# Patient Record
Sex: MALE | Race: WHITE | NOT HISPANIC OR LATINO | Employment: FULL TIME | URBAN - METROPOLITAN AREA
[De-identification: names, ages, dates, MRNs, and addresses within clinical notes are randomized per-mention and may not be internally consistent; named-entity substitution may affect disease eponyms.]

---

## 2022-03-26 ENCOUNTER — APPOINTMENT (OUTPATIENT)
Dept: CT IMAGING | Facility: HOSPITAL | Age: 47
End: 2022-03-26

## 2022-03-26 ENCOUNTER — APPOINTMENT (OUTPATIENT)
Dept: GENERAL RADIOLOGY | Facility: HOSPITAL | Age: 47
End: 2022-03-26

## 2022-03-26 ENCOUNTER — APPOINTMENT (OUTPATIENT)
Dept: CARDIOLOGY | Facility: HOSPITAL | Age: 47
End: 2022-03-26

## 2022-03-26 ENCOUNTER — HOSPITAL ENCOUNTER (INPATIENT)
Facility: HOSPITAL | Age: 47
LOS: 2 days | Discharge: HOME | End: 2022-03-28
Attending: EMERGENCY MEDICINE | Admitting: INTERNAL MEDICINE

## 2022-03-26 ENCOUNTER — APPOINTMENT (OUTPATIENT)
Dept: MRI IMAGING | Facility: HOSPITAL | Age: 47
End: 2022-03-26

## 2022-03-26 DIAGNOSIS — I63.9 CEREBROVASCULAR ACCIDENT (CVA), UNSPECIFIED MECHANISM: ICD-10-CM

## 2022-03-26 DIAGNOSIS — R20.2 NUMBNESS AND TINGLING OF LEFT ARM AND LEG: ICD-10-CM

## 2022-03-26 DIAGNOSIS — R03.0 ELEVATED BLOOD PRESSURE READING WITHOUT DIAGNOSIS OF HYPERTENSION: Primary | ICD-10-CM

## 2022-03-26 DIAGNOSIS — R20.0 NUMBNESS AND TINGLING OF LEFT ARM AND LEG: ICD-10-CM

## 2022-03-26 DIAGNOSIS — R27.8 DYSMETRIA: ICD-10-CM

## 2022-03-26 DIAGNOSIS — R73.9 HYPERGLYCEMIA: ICD-10-CM

## 2022-03-26 PROBLEM — E11.9 TYPE 2 DIABETES MELLITUS: Status: ACTIVE | Noted: 2022-03-26

## 2022-03-26 LAB
ALBUMIN SERPL-MCNC: 4.7 G/DL (ref 3.5–5.2)
ALBUMIN/GLOB SERPL: 1.6 G/DL
ALP SERPL-CCNC: 74 U/L (ref 39–117)
ALT SERPL W P-5'-P-CCNC: 28 U/L (ref 1–41)
ALT SERPL W P-5'-P-CCNC: 30 U/L (ref 1–41)
ANION GAP SERPL CALCULATED.3IONS-SCNC: 12 MMOL/L (ref 5–15)
APTT PPP: 27.7 SECONDS (ref 22–39)
AST SERPL-CCNC: 19 U/L (ref 1–40)
AST SERPL-CCNC: 20 U/L (ref 1–40)
BASOPHILS # BLD AUTO: 0.03 10*3/MM3 (ref 0–0.2)
BASOPHILS NFR BLD AUTO: 0.3 % (ref 0–1.5)
BILIRUB SERPL-MCNC: 0.6 MG/DL (ref 0–1.2)
BILIRUB UR QL STRIP: NEGATIVE
BUN BLDA-MCNC: 20 MG/DL (ref 8–26)
BUN SERPL-MCNC: 19 MG/DL (ref 6–20)
BUN/CREAT SERPL: 18.8 (ref 7–25)
CA-I BLDA-SCNC: 1.22 MMOL/L (ref 1.2–1.32)
CALCIUM SPEC-SCNC: 9.8 MG/DL (ref 8.6–10.5)
CHLORIDE BLDA-SCNC: 99 MMOL/L (ref 98–109)
CHLORIDE SERPL-SCNC: 98 MMOL/L (ref 98–107)
CLARITY UR: CLEAR
CO2 BLDA-SCNC: 25 MMOL/L (ref 24–29)
CO2 SERPL-SCNC: 24 MMOL/L (ref 22–29)
COLOR UR: YELLOW
CREAT BLDA-MCNC: 0.9 MG/DL (ref 0.6–1.3)
CREAT SERPL-MCNC: 1.01 MG/DL (ref 0.76–1.27)
DEPRECATED RDW RBC AUTO: 38.7 FL (ref 37–54)
EGFRCR SERPLBLD CKD-EPI 2021: 106 ML/MIN/1.73
EGFRCR SERPLBLD CKD-EPI 2021: 92.3 ML/MIN/1.73
EOSINOPHIL # BLD AUTO: 0.03 10*3/MM3 (ref 0–0.4)
EOSINOPHIL NFR BLD AUTO: 0.3 % (ref 0.3–6.2)
ERYTHROCYTE [DISTWIDTH] IN BLOOD BY AUTOMATED COUNT: 12.8 % (ref 12.3–15.4)
FLUAV RNA RESP QL NAA+PROBE: NOT DETECTED
FLUBV RNA RESP QL NAA+PROBE: NOT DETECTED
GLOBULIN UR ELPH-MCNC: 2.9 GM/DL
GLUCOSE BLDC GLUCOMTR-MCNC: 225 MG/DL (ref 70–130)
GLUCOSE BLDC GLUCOMTR-MCNC: 270 MG/DL (ref 70–130)
GLUCOSE BLDC GLUCOMTR-MCNC: 282 MG/DL (ref 70–130)
GLUCOSE SERPL-MCNC: 295 MG/DL (ref 65–99)
GLUCOSE UR STRIP-MCNC: ABNORMAL MG/DL
HBA1C MFR BLD: 9.4 % (ref 4.8–5.6)
HCT VFR BLD AUTO: 43.9 % (ref 37.5–51)
HCT VFR BLDA CALC: 46 % (ref 38–51)
HGB BLD-MCNC: 15.8 G/DL (ref 13–17.7)
HGB BLDA-MCNC: 15.6 G/DL (ref 12–17)
HGB UR QL STRIP.AUTO: NEGATIVE
HOLD SPECIMEN: NORMAL
IMM GRANULOCYTES # BLD AUTO: 0.03 10*3/MM3 (ref 0–0.05)
IMM GRANULOCYTES NFR BLD AUTO: 0.3 % (ref 0–0.5)
INR PPP: 1.1 (ref 0.8–1.2)
KETONES UR QL STRIP: ABNORMAL
LEUKOCYTE ESTERASE UR QL STRIP.AUTO: NEGATIVE
LYMPHOCYTES # BLD AUTO: 1.43 10*3/MM3 (ref 0.7–3.1)
LYMPHOCYTES NFR BLD AUTO: 12.2 % (ref 19.6–45.3)
MAGNESIUM SERPL-MCNC: 2.2 MG/DL (ref 1.6–2.6)
MCH RBC QN AUTO: 29.8 PG (ref 26.6–33)
MCHC RBC AUTO-ENTMCNC: 36 G/DL (ref 31.5–35.7)
MCV RBC AUTO: 82.8 FL (ref 79–97)
MONOCYTES # BLD AUTO: 0.65 10*3/MM3 (ref 0.1–0.9)
MONOCYTES NFR BLD AUTO: 5.6 % (ref 5–12)
NEUTROPHILS NFR BLD AUTO: 81.3 % (ref 42.7–76)
NEUTROPHILS NFR BLD AUTO: 9.52 10*3/MM3 (ref 1.7–7)
NITRITE UR QL STRIP: NEGATIVE
NRBC BLD AUTO-RTO: 0 /100 WBC (ref 0–0.2)
PH UR STRIP.AUTO: 5.5 [PH] (ref 5–8)
PLATELET # BLD AUTO: 217 10*3/MM3 (ref 140–450)
PMV BLD AUTO: 10.4 FL (ref 6–12)
POTASSIUM BLDA-SCNC: 3.8 MMOL/L (ref 3.5–4.9)
POTASSIUM SERPL-SCNC: 3.9 MMOL/L (ref 3.5–5.2)
PROT SERPL-MCNC: 7.6 G/DL (ref 6–8.5)
PROT UR QL STRIP: NEGATIVE
PROTHROMBIN TIME: 13.5 SECONDS (ref 12.8–15.2)
RBC # BLD AUTO: 5.3 10*6/MM3 (ref 4.14–5.8)
SARS-COV-2 RNA RESP QL NAA+PROBE: NOT DETECTED
SODIUM BLD-SCNC: 136 MMOL/L (ref 138–146)
SODIUM SERPL-SCNC: 134 MMOL/L (ref 136–145)
SP GR UR STRIP: 1.06 (ref 1–1.03)
T4 FREE SERPL-MCNC: 0.99 NG/DL (ref 0.93–1.7)
TROPONIN T SERPL-MCNC: <0.01 NG/ML (ref 0–0.03)
TSH SERPL DL<=0.05 MIU/L-ACNC: 0.63 UIU/ML (ref 0.27–4.2)
UROBILINOGEN UR QL STRIP: ABNORMAL
WBC NRBC COR # BLD: 11.69 10*3/MM3 (ref 3.4–10.8)
WHOLE BLOOD HOLD SPECIMEN: NORMAL
WHOLE BLOOD HOLD SPECIMEN: NORMAL

## 2022-03-26 PROCEDURE — 85610 PROTHROMBIN TIME: CPT

## 2022-03-26 PROCEDURE — 93306 TTE W/DOPPLER COMPLETE: CPT | Performed by: INTERNAL MEDICINE

## 2022-03-26 PROCEDURE — 93005 ELECTROCARDIOGRAM TRACING: CPT | Performed by: EMERGENCY MEDICINE

## 2022-03-26 PROCEDURE — 71045 X-RAY EXAM CHEST 1 VIEW: CPT

## 2022-03-26 PROCEDURE — 70551 MRI BRAIN STEM W/O DYE: CPT

## 2022-03-26 PROCEDURE — 82962 GLUCOSE BLOOD TEST: CPT

## 2022-03-26 PROCEDURE — 84460 ALANINE AMINO (ALT) (SGPT): CPT | Performed by: EMERGENCY MEDICINE

## 2022-03-26 PROCEDURE — 63710000001 INSULIN LISPRO (HUMAN) PER 5 UNITS: Performed by: INTERNAL MEDICINE

## 2022-03-26 PROCEDURE — 4A03X5D MEASUREMENT OF ARTERIAL FLOW, INTRACRANIAL, EXTERNAL APPROACH: ICD-10-PCS | Performed by: RADIOLOGY

## 2022-03-26 PROCEDURE — 70496 CT ANGIOGRAPHY HEAD: CPT

## 2022-03-26 PROCEDURE — 80047 BASIC METABLC PNL IONIZED CA: CPT

## 2022-03-26 PROCEDURE — 85014 HEMATOCRIT: CPT

## 2022-03-26 PROCEDURE — 84439 ASSAY OF FREE THYROXINE: CPT | Performed by: EMERGENCY MEDICINE

## 2022-03-26 PROCEDURE — 92523 SPEECH SOUND LANG COMPREHEN: CPT

## 2022-03-26 PROCEDURE — 0042T HC CT CEREBRAL PERFUSION W/WO CONTRAST: CPT

## 2022-03-26 PROCEDURE — 99284 EMERGENCY DEPT VISIT MOD MDM: CPT

## 2022-03-26 PROCEDURE — 83036 HEMOGLOBIN GLYCOSYLATED A1C: CPT | Performed by: INTERNAL MEDICINE

## 2022-03-26 PROCEDURE — 84484 ASSAY OF TROPONIN QUANT: CPT | Performed by: EMERGENCY MEDICINE

## 2022-03-26 PROCEDURE — 85730 THROMBOPLASTIN TIME PARTIAL: CPT | Performed by: EMERGENCY MEDICINE

## 2022-03-26 PROCEDURE — 93306 TTE W/DOPPLER COMPLETE: CPT

## 2022-03-26 PROCEDURE — 87636 SARSCOV2 & INF A&B AMP PRB: CPT | Performed by: INTERNAL MEDICINE

## 2022-03-26 PROCEDURE — 99223 1ST HOSP IP/OBS HIGH 75: CPT | Performed by: INTERNAL MEDICINE

## 2022-03-26 PROCEDURE — 84450 TRANSFERASE (AST) (SGOT): CPT | Performed by: EMERGENCY MEDICINE

## 2022-03-26 PROCEDURE — 0 IOPAMIDOL PER 1 ML: Performed by: EMERGENCY MEDICINE

## 2022-03-26 PROCEDURE — 70498 CT ANGIOGRAPHY NECK: CPT

## 2022-03-26 PROCEDURE — 83735 ASSAY OF MAGNESIUM: CPT | Performed by: EMERGENCY MEDICINE

## 2022-03-26 PROCEDURE — 84443 ASSAY THYROID STIM HORMONE: CPT | Performed by: EMERGENCY MEDICINE

## 2022-03-26 PROCEDURE — 70450 CT HEAD/BRAIN W/O DYE: CPT

## 2022-03-26 PROCEDURE — 99223 1ST HOSP IP/OBS HIGH 75: CPT | Performed by: NURSE PRACTITIONER

## 2022-03-26 PROCEDURE — 85025 COMPLETE CBC W/AUTO DIFF WBC: CPT | Performed by: EMERGENCY MEDICINE

## 2022-03-26 PROCEDURE — 80053 COMPREHEN METABOLIC PANEL: CPT | Performed by: EMERGENCY MEDICINE

## 2022-03-26 PROCEDURE — 81003 URINALYSIS AUTO W/O SCOPE: CPT | Performed by: EMERGENCY MEDICINE

## 2022-03-26 RX ORDER — NICOTINE POLACRILEX 4 MG
15 LOZENGE BUCCAL
Status: DISCONTINUED | OUTPATIENT
Start: 2022-03-26 | End: 2022-03-28 | Stop reason: HOSPADM

## 2022-03-26 RX ORDER — ATORVASTATIN CALCIUM 40 MG/1
80 TABLET, FILM COATED ORAL NIGHTLY
Status: DISCONTINUED | OUTPATIENT
Start: 2022-03-26 | End: 2022-03-28 | Stop reason: HOSPADM

## 2022-03-26 RX ORDER — CLOPIDOGREL BISULFATE 75 MG/1
75 TABLET ORAL DAILY
Status: DISCONTINUED | OUTPATIENT
Start: 2022-03-27 | End: 2022-03-28 | Stop reason: HOSPADM

## 2022-03-26 RX ORDER — SODIUM CHLORIDE 0.9 % (FLUSH) 0.9 %
10 SYRINGE (ML) INJECTION AS NEEDED
Status: DISCONTINUED | OUTPATIENT
Start: 2022-03-26 | End: 2022-03-28 | Stop reason: HOSPADM

## 2022-03-26 RX ORDER — ACETAMINOPHEN 325 MG/1
650 TABLET ORAL EVERY 4 HOURS PRN
Status: DISCONTINUED | OUTPATIENT
Start: 2022-03-26 | End: 2022-03-28 | Stop reason: HOSPADM

## 2022-03-26 RX ORDER — CLOPIDOGREL BISULFATE 75 MG/1
300 TABLET ORAL ONCE
Status: COMPLETED | OUTPATIENT
Start: 2022-03-26 | End: 2022-03-26

## 2022-03-26 RX ORDER — ONDANSETRON 2 MG/ML
4 INJECTION INTRAMUSCULAR; INTRAVENOUS EVERY 6 HOURS PRN
Status: DISCONTINUED | OUTPATIENT
Start: 2022-03-26 | End: 2022-03-28 | Stop reason: HOSPADM

## 2022-03-26 RX ORDER — DEXTROSE MONOHYDRATE 25 G/50ML
25 INJECTION, SOLUTION INTRAVENOUS
Status: DISCONTINUED | OUTPATIENT
Start: 2022-03-26 | End: 2022-03-28 | Stop reason: HOSPADM

## 2022-03-26 RX ORDER — ASPIRIN 81 MG/1
81 TABLET, CHEWABLE ORAL DAILY
Status: DISCONTINUED | OUTPATIENT
Start: 2022-03-26 | End: 2022-03-28 | Stop reason: HOSPADM

## 2022-03-26 RX ORDER — SODIUM CHLORIDE 0.9 % (FLUSH) 0.9 %
10 SYRINGE (ML) INJECTION EVERY 12 HOURS SCHEDULED
Status: DISCONTINUED | OUTPATIENT
Start: 2022-03-26 | End: 2022-03-28 | Stop reason: HOSPADM

## 2022-03-26 RX ORDER — ASPIRIN 300 MG/1
300 SUPPOSITORY RECTAL DAILY
Status: DISCONTINUED | OUTPATIENT
Start: 2022-03-26 | End: 2022-03-28

## 2022-03-26 RX ADMIN — ASPIRIN 81 MG 81 MG: 81 TABLET ORAL at 13:07

## 2022-03-26 RX ADMIN — CLOPIDOGREL BISULFATE 300 MG: 75 TABLET ORAL at 13:06

## 2022-03-26 RX ADMIN — INSULIN LISPRO 3 UNITS: 100 INJECTION, SOLUTION INTRAVENOUS; SUBCUTANEOUS at 17:38

## 2022-03-26 RX ADMIN — INSULIN LISPRO 4 UNITS: 100 INJECTION, SOLUTION INTRAVENOUS; SUBCUTANEOUS at 13:07

## 2022-03-26 RX ADMIN — IOPAMIDOL 125 ML: 755 INJECTION, SOLUTION INTRAVENOUS at 08:28

## 2022-03-26 RX ADMIN — Medication 10 ML: at 21:02

## 2022-03-26 RX ADMIN — Medication 10 ML: at 14:08

## 2022-03-26 RX ADMIN — ATORVASTATIN CALCIUM 80 MG: 40 TABLET, FILM COATED ORAL at 21:02

## 2022-03-27 LAB
ANION GAP SERPL CALCULATED.3IONS-SCNC: 12 MMOL/L (ref 5–15)
ASCENDING AORTA: 3.6 CM
BASOPHILS # BLD AUTO: 0.03 10*3/MM3 (ref 0–0.2)
BASOPHILS NFR BLD AUTO: 0.3 % (ref 0–1.5)
BH CV ECHO MEAS - AO MAX PG (FULL): 3.8 MMHG
BH CV ECHO MEAS - AO MAX PG: 8.1 MMHG
BH CV ECHO MEAS - AO MEAN PG (FULL): 2.1 MMHG
BH CV ECHO MEAS - AO MEAN PG: 4.6 MMHG
BH CV ECHO MEAS - AO ROOT AREA (BSA CORRECTED): 1.5
BH CV ECHO MEAS - AO ROOT AREA: 9.9 CM^2
BH CV ECHO MEAS - AO ROOT DIAM: 3.6 CM
BH CV ECHO MEAS - AO V2 MAX: 142.5 CM/SEC
BH CV ECHO MEAS - AO V2 MEAN: 101.3 CM/SEC
BH CV ECHO MEAS - AO V2 VTI: 27.6 CM
BH CV ECHO MEAS - ASC AORTA: 3.6 CM
BH CV ECHO MEAS - AVA(I,A): 3.1 CM^2
BH CV ECHO MEAS - AVA(I,D): 3.1 CM^2
BH CV ECHO MEAS - AVA(V,A): 2.7 CM^2
BH CV ECHO MEAS - AVA(V,D): 2.7 CM^2
BH CV ECHO MEAS - BSA(HAYCOCK): 2.5 M^2
BH CV ECHO MEAS - BSA: 2.3 M^2
BH CV ECHO MEAS - BZI_BMI: 37.3 KILOGRAMS/M^2
BH CV ECHO MEAS - BZI_METRIC_HEIGHT: 177.8 CM
BH CV ECHO MEAS - BZI_METRIC_WEIGHT: 117.9 KG
BH CV ECHO MEAS - EDV(CUBED): 123.7 ML
BH CV ECHO MEAS - EDV(MOD-SP2): 95 ML
BH CV ECHO MEAS - EDV(MOD-SP4): 91 ML
BH CV ECHO MEAS - EDV(TEICH): 117.3 ML
BH CV ECHO MEAS - EF(CUBED): 69.6 %
BH CV ECHO MEAS - EF(MOD-BP): 60 %
BH CV ECHO MEAS - EF(MOD-SP2): 58.9 %
BH CV ECHO MEAS - EF(MOD-SP4): 60.4 %
BH CV ECHO MEAS - EF(TEICH): 60.9 %
BH CV ECHO MEAS - ESV(CUBED): 37.7 ML
BH CV ECHO MEAS - ESV(MOD-SP2): 39 ML
BH CV ECHO MEAS - ESV(MOD-SP4): 36 ML
BH CV ECHO MEAS - ESV(TEICH): 45.8 ML
BH CV ECHO MEAS - FS: 32.7 %
BH CV ECHO MEAS - IVS/LVPW: 1
BH CV ECHO MEAS - IVSD: 1.2 CM
BH CV ECHO MEAS - LA DIMENSION: 3.2 CM
BH CV ECHO MEAS - LA/AO: 0.9
BH CV ECHO MEAS - LAD MAJOR: 6.1 CM
BH CV ECHO MEAS - LAT PEAK E' VEL: 5.8 CM/SEC
BH CV ECHO MEAS - LATERAL E/E' RATIO: 10.4
BH CV ECHO MEAS - LV DIASTOLIC VOL/BSA (35-75): 39 ML/M^2
BH CV ECHO MEAS - LV MASS(C)D: 226.7 GRAMS
BH CV ECHO MEAS - LV MASS(C)DI: 97.1 GRAMS/M^2
BH CV ECHO MEAS - LV MAX PG: 4.3 MMHG
BH CV ECHO MEAS - LV MEAN PG: 2.6 MMHG
BH CV ECHO MEAS - LV SYSTOLIC VOL/BSA (12-30): 15.4 ML/M^2
BH CV ECHO MEAS - LV V1 MAX: 104.1 CM/SEC
BH CV ECHO MEAS - LV V1 MEAN: 76.4 CM/SEC
BH CV ECHO MEAS - LV V1 VTI: 23.2 CM
BH CV ECHO MEAS - LVIDD: 5 CM
BH CV ECHO MEAS - LVIDS: 3.4 CM
BH CV ECHO MEAS - LVLD AP2: 8.6 CM
BH CV ECHO MEAS - LVLD AP4: 8.8 CM
BH CV ECHO MEAS - LVLS AP2: 8.1 CM
BH CV ECHO MEAS - LVLS AP4: 8.1 CM
BH CV ECHO MEAS - LVOT AREA (M): 3.8 CM^2
BH CV ECHO MEAS - LVOT AREA: 3.7 CM^2
BH CV ECHO MEAS - LVOT DIAM: 2.2 CM
BH CV ECHO MEAS - LVPWD: 1.2 CM
BH CV ECHO MEAS - MED PEAK E' VEL: 4.6 CM/SEC
BH CV ECHO MEAS - MEDIAL E/E' RATIO: 13.1
BH CV ECHO MEAS - MV A MAX VEL: 88.4 CM/SEC
BH CV ECHO MEAS - MV DEC SLOPE: 299.4 CM/SEC^2
BH CV ECHO MEAS - MV DEC TIME: 0.27 SEC
BH CV ECHO MEAS - MV E MAX VEL: 62.2 CM/SEC
BH CV ECHO MEAS - MV E/A: 0.7
BH CV ECHO MEAS - MV MAX PG: 3.9 MMHG
BH CV ECHO MEAS - MV MEAN PG: 1.8 MMHG
BH CV ECHO MEAS - MV P1/2T MAX VEL: 82.8 CM/SEC
BH CV ECHO MEAS - MV P1/2T: 81 MSEC
BH CV ECHO MEAS - MV V2 MAX: 99.1 CM/SEC
BH CV ECHO MEAS - MV V2 MEAN: 62 CM/SEC
BH CV ECHO MEAS - MV V2 VTI: 26.4 CM
BH CV ECHO MEAS - MVA P1/2T LCG: 2.7 CM^2
BH CV ECHO MEAS - MVA(P1/2T): 2.7 CM^2
BH CV ECHO MEAS - MVA(VTI): 3.3 CM^2
BH CV ECHO MEAS - PA ACC SLOPE: 493.9 CM/SEC^2
BH CV ECHO MEAS - PA ACC TIME: 0.13 SEC
BH CV ECHO MEAS - PA PR(ACCEL): 20.2 MMHG
BH CV ECHO MEAS - SI(AO): 117.3 ML/M^2
BH CV ECHO MEAS - SI(CUBED): 36.9 ML/M^2
BH CV ECHO MEAS - SI(LVOT): 37.1 ML/M^2
BH CV ECHO MEAS - SI(MOD-SP2): 24 ML/M^2
BH CV ECHO MEAS - SI(MOD-SP4): 23.6 ML/M^2
BH CV ECHO MEAS - SI(TEICH): 30.6 ML/M^2
BH CV ECHO MEAS - SV(AO): 273.7 ML
BH CV ECHO MEAS - SV(CUBED): 86.1 ML
BH CV ECHO MEAS - SV(LVOT): 86.6 ML
BH CV ECHO MEAS - SV(MOD-SP2): 56 ML
BH CV ECHO MEAS - SV(MOD-SP4): 55 ML
BH CV ECHO MEAS - SV(TEICH): 71.5 ML
BH CV ECHO MEAS - TAPSE (>1.6): 2.3 CM
BH CV ECHO MEASUREMENTS AVERAGE E/E' RATIO: 11.96
BH CV VAS BP RIGHT ARM: NORMAL MMHG
BH CV XLRA - RV BASE: 2.7 CM
BH CV XLRA - RV LENGTH: 6.6 CM
BH CV XLRA - RV MID: 2.7 CM
BH CV XLRA - TDI S': 13.3 CM/SEC
BUN SERPL-MCNC: 14 MG/DL (ref 6–20)
BUN/CREAT SERPL: 13.7 (ref 7–25)
CALCIUM SPEC-SCNC: 9.3 MG/DL (ref 8.6–10.5)
CHLORIDE SERPL-SCNC: 100 MMOL/L (ref 98–107)
CHOLEST SERPL-MCNC: 222 MG/DL (ref 0–200)
CO2 SERPL-SCNC: 24 MMOL/L (ref 22–29)
CREAT SERPL-MCNC: 1.02 MG/DL (ref 0.76–1.27)
DEPRECATED RDW RBC AUTO: 40.1 FL (ref 37–54)
EGFRCR SERPLBLD CKD-EPI 2021: 91.2 ML/MIN/1.73
EOSINOPHIL # BLD AUTO: 0.22 10*3/MM3 (ref 0–0.4)
EOSINOPHIL NFR BLD AUTO: 2.1 % (ref 0.3–6.2)
ERYTHROCYTE [DISTWIDTH] IN BLOOD BY AUTOMATED COUNT: 13.1 % (ref 12.3–15.4)
GLUCOSE BLDC GLUCOMTR-MCNC: 189 MG/DL (ref 70–130)
GLUCOSE BLDC GLUCOMTR-MCNC: 198 MG/DL (ref 70–130)
GLUCOSE BLDC GLUCOMTR-MCNC: 238 MG/DL (ref 70–130)
GLUCOSE SERPL-MCNC: 205 MG/DL (ref 65–99)
HCT VFR BLD AUTO: 43.3 % (ref 37.5–51)
HDLC SERPL-MCNC: 36 MG/DL (ref 40–60)
HGB BLD-MCNC: 15.4 G/DL (ref 13–17.7)
IMM GRANULOCYTES # BLD AUTO: 0.03 10*3/MM3 (ref 0–0.05)
IMM GRANULOCYTES NFR BLD AUTO: 0.3 % (ref 0–0.5)
LDLC SERPL CALC-MCNC: 165 MG/DL (ref 0–100)
LDLC/HDLC SERPL: 4.53 {RATIO}
LEFT ATRIUM VOLUME INDEX: 21 ML/M^2
LEFT ATRIUM VOLUME: 49 ML
LV EF 2D ECHO EST: 60 %
LYMPHOCYTES # BLD AUTO: 1.76 10*3/MM3 (ref 0.7–3.1)
LYMPHOCYTES NFR BLD AUTO: 17.2 % (ref 19.6–45.3)
MAXIMAL PREDICTED HEART RATE: 173 BPM
MCH RBC QN AUTO: 29.8 PG (ref 26.6–33)
MCHC RBC AUTO-ENTMCNC: 35.6 G/DL (ref 31.5–35.7)
MCV RBC AUTO: 83.9 FL (ref 79–97)
MONOCYTES # BLD AUTO: 0.92 10*3/MM3 (ref 0.1–0.9)
MONOCYTES NFR BLD AUTO: 9 % (ref 5–12)
NEUTROPHILS NFR BLD AUTO: 7.29 10*3/MM3 (ref 1.7–7)
NEUTROPHILS NFR BLD AUTO: 71.1 % (ref 42.7–76)
NRBC BLD AUTO-RTO: 0 /100 WBC (ref 0–0.2)
PLATELET # BLD AUTO: 192 10*3/MM3 (ref 140–450)
PMV BLD AUTO: 10.4 FL (ref 6–12)
POTASSIUM SERPL-SCNC: 3.9 MMOL/L (ref 3.5–5.2)
QT INTERVAL: 416 MS
QTC INTERVAL: 432 MS
RBC # BLD AUTO: 5.16 10*6/MM3 (ref 4.14–5.8)
SODIUM SERPL-SCNC: 136 MMOL/L (ref 136–145)
STRESS TARGET HR: 147 BPM
TRIGL SERPL-MCNC: 115 MG/DL (ref 0–150)
VLDLC SERPL-MCNC: 21 MG/DL (ref 5–40)
WBC NRBC COR # BLD: 10.25 10*3/MM3 (ref 3.4–10.8)

## 2022-03-27 PROCEDURE — 85025 COMPLETE CBC W/AUTO DIFF WBC: CPT | Performed by: INTERNAL MEDICINE

## 2022-03-27 PROCEDURE — 63710000001 INSULIN LISPRO (HUMAN) PER 5 UNITS: Performed by: INTERNAL MEDICINE

## 2022-03-27 PROCEDURE — 80061 LIPID PANEL: CPT | Performed by: INTERNAL MEDICINE

## 2022-03-27 PROCEDURE — 82962 GLUCOSE BLOOD TEST: CPT

## 2022-03-27 PROCEDURE — 99231 SBSQ HOSP IP/OBS SF/LOW 25: CPT | Performed by: NURSE PRACTITIONER

## 2022-03-27 PROCEDURE — 97165 OT EVAL LOW COMPLEX 30 MIN: CPT

## 2022-03-27 PROCEDURE — 99233 SBSQ HOSP IP/OBS HIGH 50: CPT | Performed by: INTERNAL MEDICINE

## 2022-03-27 PROCEDURE — 80048 BASIC METABOLIC PNL TOTAL CA: CPT | Performed by: INTERNAL MEDICINE

## 2022-03-27 PROCEDURE — 97161 PT EVAL LOW COMPLEX 20 MIN: CPT

## 2022-03-27 RX ORDER — AMLODIPINE BESYLATE 5 MG/1
5 TABLET ORAL
Status: DISCONTINUED | OUTPATIENT
Start: 2022-03-27 | End: 2022-03-28 | Stop reason: HOSPADM

## 2022-03-27 RX ORDER — HYDRALAZINE HYDROCHLORIDE 20 MG/ML
10 INJECTION INTRAMUSCULAR; INTRAVENOUS EVERY 6 HOURS PRN
Status: DISCONTINUED | OUTPATIENT
Start: 2022-03-27 | End: 2022-03-28 | Stop reason: HOSPADM

## 2022-03-27 RX ORDER — LISINOPRIL 10 MG/1
10 TABLET ORAL
Status: DISCONTINUED | OUTPATIENT
Start: 2022-03-27 | End: 2022-03-28 | Stop reason: HOSPADM

## 2022-03-27 RX ADMIN — AMLODIPINE BESYLATE 5 MG: 5 TABLET ORAL at 13:30

## 2022-03-27 RX ADMIN — INSULIN LISPRO 2 UNITS: 100 INJECTION, SOLUTION INTRAVENOUS; SUBCUTANEOUS at 09:03

## 2022-03-27 RX ADMIN — INSULIN LISPRO 2 UNITS: 100 INJECTION, SOLUTION INTRAVENOUS; SUBCUTANEOUS at 17:13

## 2022-03-27 RX ADMIN — CLOPIDOGREL BISULFATE 75 MG: 75 TABLET ORAL at 09:03

## 2022-03-27 RX ADMIN — ATORVASTATIN CALCIUM 80 MG: 40 TABLET, FILM COATED ORAL at 20:58

## 2022-03-27 RX ADMIN — ASPIRIN 81 MG 81 MG: 81 TABLET ORAL at 09:03

## 2022-03-27 RX ADMIN — LISINOPRIL 10 MG: 10 TABLET ORAL at 10:36

## 2022-03-27 RX ADMIN — Medication 10 ML: at 20:58

## 2022-03-27 RX ADMIN — Medication 10 ML: at 09:05

## 2022-03-27 RX ADMIN — ACETAMINOPHEN 650 MG: 325 TABLET ORAL at 10:36

## 2022-03-27 RX ADMIN — INSULIN LISPRO 3 UNITS: 100 INJECTION, SOLUTION INTRAVENOUS; SUBCUTANEOUS at 13:30

## 2022-03-28 ENCOUNTER — READMISSION MANAGEMENT (OUTPATIENT)
Dept: CALL CENTER | Facility: HOSPITAL | Age: 47
End: 2022-03-28

## 2022-03-28 ENCOUNTER — TELEPHONE (OUTPATIENT)
Dept: PEDIATRICS | Facility: OTHER | Age: 47
End: 2022-03-28

## 2022-03-28 VITALS
RESPIRATION RATE: 18 BRPM | WEIGHT: 260.14 LBS | DIASTOLIC BLOOD PRESSURE: 98 MMHG | OXYGEN SATURATION: 97 % | SYSTOLIC BLOOD PRESSURE: 137 MMHG | HEART RATE: 74 BPM | TEMPERATURE: 97.5 F | BODY MASS INDEX: 37.24 KG/M2 | HEIGHT: 70 IN

## 2022-03-28 PROBLEM — E11.9 CONTROLLED TYPE 2 DIABETES MELLITUS WITHOUT COMPLICATION, WITHOUT LONG-TERM CURRENT USE OF INSULIN: Status: ACTIVE | Noted: 2022-03-26

## 2022-03-28 PROBLEM — I10 ESSENTIAL HYPERTENSION: Status: ACTIVE | Noted: 2022-03-26

## 2022-03-28 PROBLEM — E11.65 CONTROLLED TYPE 2 DIABETES MELLITUS WITH HYPERGLYCEMIA, WITHOUT LONG-TERM CURRENT USE OF INSULIN: Status: ACTIVE | Noted: 2022-03-26

## 2022-03-28 LAB
GLUCOSE BLDC GLUCOMTR-MCNC: 155 MG/DL (ref 70–130)
GLUCOSE BLDC GLUCOMTR-MCNC: 209 MG/DL (ref 70–130)

## 2022-03-28 PROCEDURE — 63710000001 INSULIN LISPRO (HUMAN) PER 5 UNITS: Performed by: INTERNAL MEDICINE

## 2022-03-28 PROCEDURE — G0108 DIAB MANAGE TRN  PER INDIV: HCPCS | Performed by: REGISTERED NURSE

## 2022-03-28 PROCEDURE — 82962 GLUCOSE BLOOD TEST: CPT

## 2022-03-28 PROCEDURE — 99239 HOSP IP/OBS DSCHRG MGMT >30: CPT | Performed by: PHYSICIAN ASSISTANT

## 2022-03-28 RX ORDER — LANCETS 30 GAUGE
EACH MISCELLANEOUS
Qty: 100 EACH | Refills: 5 | Status: SHIPPED | OUTPATIENT
Start: 2022-03-28

## 2022-03-28 RX ORDER — ASPIRIN 325 MG
325 TABLET, DELAYED RELEASE (ENTERIC COATED) ORAL DAILY
Qty: 30 TABLET | Refills: 11 | Status: SHIPPED | OUTPATIENT
Start: 2022-03-28 | End: 2022-04-04 | Stop reason: SDUPTHER

## 2022-03-28 RX ORDER — ATORVASTATIN CALCIUM 80 MG/1
80 TABLET, FILM COATED ORAL NIGHTLY
Qty: 30 TABLET | Refills: 5 | Status: SHIPPED | OUTPATIENT
Start: 2022-03-28 | End: 2022-04-29 | Stop reason: SDUPTHER

## 2022-03-28 RX ORDER — LISINOPRIL 10 MG/1
10 TABLET ORAL
Qty: 30 TABLET | Refills: 1 | Status: SHIPPED | OUTPATIENT
Start: 2022-03-29 | End: 2022-04-19

## 2022-03-28 RX ORDER — ASPIRIN 81 MG/1
81 TABLET, CHEWABLE ORAL DAILY
Qty: 21 TABLET | Refills: 0 | Status: SHIPPED | OUTPATIENT
Start: 2022-03-29 | End: 2022-04-29

## 2022-03-28 RX ORDER — GLIPIZIDE 5 MG/1
2.5 TABLET ORAL
Status: DISCONTINUED | OUTPATIENT
Start: 2022-03-28 | End: 2022-03-28 | Stop reason: HOSPADM

## 2022-03-28 RX ORDER — CLOPIDOGREL BISULFATE 75 MG/1
75 TABLET ORAL DAILY
Qty: 21 TABLET | Refills: 0 | Status: SHIPPED | OUTPATIENT
Start: 2022-03-29 | End: 2022-04-19

## 2022-03-28 RX ORDER — GLIPIZIDE 5 MG/1
2.5 TABLET ORAL
Status: DISCONTINUED | OUTPATIENT
Start: 2022-03-28 | End: 2022-03-28

## 2022-03-28 RX ADMIN — GLIPIZIDE 2.5 MG: 5 TABLET ORAL at 10:22

## 2022-03-28 RX ADMIN — LISINOPRIL 10 MG: 10 TABLET ORAL at 08:57

## 2022-03-28 RX ADMIN — AMLODIPINE BESYLATE 5 MG: 5 TABLET ORAL at 08:57

## 2022-03-28 RX ADMIN — INSULIN LISPRO 3 UNITS: 100 INJECTION, SOLUTION INTRAVENOUS; SUBCUTANEOUS at 08:57

## 2022-03-28 RX ADMIN — CLOPIDOGREL BISULFATE 75 MG: 75 TABLET ORAL at 08:57

## 2022-03-28 RX ADMIN — INSULIN LISPRO 2 UNITS: 100 INJECTION, SOLUTION INTRAVENOUS; SUBCUTANEOUS at 11:44

## 2022-03-28 RX ADMIN — METFORMIN HYDROCHLORIDE 500 MG: 500 TABLET ORAL at 08:57

## 2022-03-28 RX ADMIN — ASPIRIN 81 MG 81 MG: 81 TABLET ORAL at 08:57

## 2022-03-28 NOTE — OUTREACH NOTE
Prep Survey    Flowsheet Row Responses   [facility] patient discharged from? [place]   Is LACE score < 7 ? No   Emergency Room discharge w/ pulse ox? No   Eligibility [facility]    Date of Admission 03/26/22   Date of Discharge 03/28/22   Discharge diagnosis Stroke,  Essential hypertension,  Uncontrolled type 2 diabetes mellitus with hyperglycemia    Does the patient have one of the following disease processes/diagnoses(primary or secondary)? Stroke (TIA)   Prep survey completed? Yes          MATT LOZOYA - Registered Nurse

## 2022-03-28 NOTE — TELEPHONE ENCOUNTER
Caller: FAUSTINA NURSE  WITH [facility]    Relationship to patient: Other    Best call back number:[phone]  New or established patient?  [x] New  [] Established    Date of discharge: 03/28/2022    Facility discharged from: [facility]    Diagnosis/Symptoms: STROKE  Length of stay (If applicable): 03/26/2022 - 03/28/2022    Specialty Only: Did you see a [facility] provider?    [x] Yes  [] No  If so, who? APRN ESTRELLA MEDINA - NEUROLOGY        THE NURSE  REPORTS THE PATIENT CAN BE REACHED [phone]

## 2022-03-29 ENCOUNTER — TRANSITIONAL CARE MANAGEMENT TELEPHONE ENCOUNTER (OUTPATIENT)
Dept: CALL CENTER | Facility: HOSPITAL | Age: 47
End: 2022-03-29

## 2022-03-29 NOTE — OUTREACH NOTE
Call Center TCM Note    Flowsheet Row Responses   [facility] patient discharged from? [place]   Does the patient have one of the following disease processes/diagnoses(primary or secondary)? Stroke (TIA)   TCM attempt successful? Yes   Call start time 1347   Call end time 1350   Discharge diagnosis Stroke,  Essential hypertension,  Uncontrolled type 2 diabetes mellitus with hyperglycemia    Meds reviewed with patient/caregiver? Yes   Is the patient having any side effects they believe may be caused by any medication additions or changes? No   Does the patient have all medications ordered at discharge? Yes   Is the patient taking all medications as directed (includes completed medication regime)? Yes   Does the patient have a primary care provider?  Yes   Does the patient have an appointment with their PCP within 7 days of discharge? Yes   Comments regarding PCP [facility] on 4-4-22    Has the patient kept scheduled appointments due by today? N/A   Does the patient require any assistance with activities of daily living such as eating, bathing, dressing, walking, etc.? No   Does the patient have any residual symptoms from stroke/TIA? Yes   Residual symptoms comments tingling in the hand and leg (left)    Does the patient understand the diet ordered at discharge? Yes   Did the patient receive a copy of their discharge instructions? Yes   Nursing interventions Reviewed instructions with patient   What is the patient's perception of their health status since discharge? Same   Nursing interventions Nurse provided patient education   Is the patient able to teach back FAST for Stroke? Yes   Is the patient/caregiver able to teach back the risk factors for a stroke? Diabetes, High blood pressure-goal below 120/80  [BP this morning was 137/87 spouse reports ]   Is the patient/caregiver able to teach back signs and symptoms related to disease process for when to call PCP? Yes   Is the patient/caregiver able to teach  back signs and symptoms related to disease process for when to call 911? Yes   If the patient is a current smoker, are they able to teach back resources for cessation? Not a smoker   Is the patient/caregiver able to teach back the hierarchy of who to call/visit for symptoms/problems? PCP, Specialist, Home health nurse, Urgent Care, ED, 911 Yes   TCM call completed? Yes          Li Douglass RN    3/29/2022, 13:51 EDT

## 2022-04-04 ENCOUNTER — OFFICE VISIT (OUTPATIENT)
Dept: INTERNAL MEDICINE | Facility: CLINIC | Age: 47
End: 2022-04-04

## 2022-04-04 VITALS
HEART RATE: 90 BPM | RESPIRATION RATE: 18 BRPM | SYSTOLIC BLOOD PRESSURE: 122 MMHG | BODY MASS INDEX: 36.49 KG/M2 | DIASTOLIC BLOOD PRESSURE: 72 MMHG | TEMPERATURE: 98 F | OXYGEN SATURATION: 97 % | WEIGHT: 252 LBS

## 2022-04-04 DIAGNOSIS — I10 ESSENTIAL HYPERTENSION: ICD-10-CM

## 2022-04-04 DIAGNOSIS — Z12.11 ENCOUNTER FOR SCREENING COLONOSCOPY: ICD-10-CM

## 2022-04-04 DIAGNOSIS — E11.65 TYPE 2 DIABETES MELLITUS WITH HYPERGLYCEMIA, WITHOUT LONG-TERM CURRENT USE OF INSULIN: Primary | ICD-10-CM

## 2022-04-04 DIAGNOSIS — Z09 HOSPITAL DISCHARGE FOLLOW-UP: ICD-10-CM

## 2022-04-04 PROCEDURE — 99495 TRANSJ CARE MGMT MOD F2F 14D: CPT

## 2022-04-04 NOTE — PROGRESS NOTES
Transitional Care Follow Up Visit  Subjective     Kee Gutierrez is a 46 y.o. male who presents for a transitional care management visit.    Within 48 business hours after discharge our office contacted him via telephone to coordinate his care and needs.      I reviewed and discussed the details of that call along with the discharge summary, hospital problems, inpatient lab results, inpatient diagnostic studies, and consultation reports with Kee.     Current outpatient and discharge medications have been reconciled for the patient.  Reviewed by: HECTOR Dickey    HTN- Taking lisinopril each day with no omitted doses and no side effects reported. Has been monitoring blood pressures at home with systolic's in the 130's and diastolic's in the 80's. Walking each day. Has been reducing Na intake in the diet. Currently asymptomatic.     DM- Taking metformin (2000 mg) and Januvia (50 mg) each day with no omitted doses and no side effects reported. Has a meeting with DM education tomorrow. Monitoring BG at home with FPG with readings averaging in the 140's. Endorses polyphagia and polydipsia. Denies neuropathies, polyuria or poor wound healing. Last A1c 9.4%.     Date of TCM Phone Call 3/28/2022   [facility]    Date of Admission 3/26/2022   Date of Discharge 3/28/2022     Risk for Readmission (LACE) Score: 7 (3/28/2022  6:01 AM)      Hospital Course:  Kee Gutierrez is a 46 y.o. male with PMH significant for HTN. He does not seek regular medical care and does not take any medications. On the morning of 3/26/22, he woke around 2 or 3AM with left hand tingling/numbness and left leg weakness. 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 His symptoms persisted while at work and he presented to [facility] ED for further evaluation. Has a scheduled follow-up with neurology on 4/29.           Course During Hospital Stay:      Acute to subacute infarct  right thalamic area  - Appreciate stroke neurology team assistance   - CT head, CT perfusion negative  - MRI brain showed an acute vs subacute R thalamic infarction  - Loaded with Plavix. Started on ASA and high-intensity statin  - At DC, neurology recommends high-intensity statin and ASA 81mg daily / Plavix 75mg daily x 21 days, then transition to ASA 325mg daily.   - ECHO showed normal LVEF, normal bubble study  - PT/OT/SLP have evaluated. Outpatient PT at [place]  - Follow up with stroke neurology team in 4-6 weeks     Uncontrolled HTN  - Started on Lisinopril 10mg daily  - Encouraged BP checks at home. May need further medication titration     Uncontrolled DMII, new diagnosis  - Hgb A1c 9.4%  - DM educator has seen - patient given glucometer. Rx for lancets / test strips sent  - Home on Metformin 1000mg BID and Januvia 50mg daily       The following portions of the patient's history were reviewed and updated as appropriate: allergies, current medications, past family history, past medical history, past social history, past surgical history and problem list.    Review of Systems   Constitutional: Negative for activity change, appetite change, chills, diaphoresis, fatigue, fever and unexpected weight change.   Respiratory: Negative for apnea, cough, choking, chest tightness, shortness of breath, wheezing and stridor.    Cardiovascular: Negative for chest pain, palpitations and leg swelling.   Gastrointestinal: Negative for abdominal distention, abdominal pain, anal bleeding, blood in stool, constipation, diarrhea, nausea, rectal pain and vomiting.   Endocrine: Negative for cold intolerance, heat intolerance, polydipsia, polyphagia and polyuria.   Genitourinary: Negative for decreased urine volume, difficulty urinating, dysuria, enuresis, flank pain, frequency, genital sores, hematuria, penile discharge, penile pain, penile swelling, scrotal swelling, testicular pain and urgency.   Musculoskeletal: Negative for  arthralgias, back pain, gait problem, joint swelling, myalgias, neck pain and neck stiffness.   Skin: Negative for color change, pallor, rash and wound.   Allergic/Immunologic: Negative for environmental allergies and food allergies.   Neurological: Positive for numbness. Negative for dizziness, tremors, seizures, syncope, facial asymmetry, speech difficulty, weakness, light-headedness and headaches.   Hematological: Negative for adenopathy. Does not bruise/bleed easily.       Objective   Physical Exam  Constitutional:       Appearance: Normal appearance.   HENT:      Head: Normocephalic.      Mouth/Throat:      Mouth: Mucous membranes are moist.   Eyes:      Extraocular Movements: Extraocular movements intact.      Conjunctiva/sclera: Conjunctivae normal.      Pupils: Pupils are equal, round, and reactive to light.   Cardiovascular:      Rate and Rhythm: Normal rate and regular rhythm.      Pulses: Normal pulses.      Heart sounds: Normal heart sounds.   Pulmonary:      Effort: Pulmonary effort is normal.      Breath sounds: Normal breath sounds.   Abdominal:      General: Bowel sounds are normal.      Palpations: Abdomen is soft.   Skin:     General: Skin is warm and dry.      Capillary Refill: Capillary refill takes less than 2 seconds.   Neurological:      Mental Status: He is alert and oriented to person, place, and time.   Psychiatric:         Mood and Affect: Mood normal.         Behavior: Behavior normal.         Thought Content: Thought content normal.         Judgment: Judgment normal.         Assessment/Plan   Diagnoses and all orders for this visit:    1. Type 2 diabetes mellitus with hyperglycemia, without long-term current use of insulin (HCC) (Primary)  -     Ambulatory Referral for Diabetic Eye Exam-Ophthalmology  -     metFORMIN (GLUCOPHAGE) 1000 MG tablet; Take 1 tablet by mouth 2 (Two) Times a Day With Meals.  Dispense: 60 tablet; Refill: 5  -     SITagliptin (JANUVIA) 100 MG tablet; Take 1 tablet  by mouth Daily.  Dispense: 90 tablet; Refill: 1  -     CBC & Differential; Future  -     Comprehensive Metabolic Panel; Future  -     TSH Rfx On Abnormal To Free T4; Future  -     Microalbumin / Creatinine Urine Ratio - Urine, Clean Catch; Future  -     Ambulatory Referral to Nutrition Services  -     Hepatitis C Antibody; Future        -     Uncontrolled based on home readings. Will increase januvia to 100 mg and continue with metformin at 2000 mg per day. Encouraged to continue to monitor blood glucose at home obtaining a combination of both fasting blood glucoses (upon awakening/before eating) and post-prandial blood glucoses (2 hours after beginning of meals). FBGs should be between  and post-prandial blood glucoses should be less than 180 to ensure that A1C is less than 7%. Encouraged to consume a diet low in saturated fat (13 g max), refined carbohydrates, and fructose corn syrup. Consume a diet high in fiber (30g/day), lean protein, and fresh produce intake. Increase physical activity to include 150 mins per week (30 mins per day 5 days per week) of moderate intensity aerobic activity and resistance training 2-3 x per week. Attempt to lose weight to achieve an ideal BMI of 18.5-24.9. Perform foot inspection and foot care daily.    Goals for you:   • A1C less than 7%  • FPG should be   • PP should be less than 180  • BP should be less than 130/80  • LDL less than 100  • Albumin/creatinine ratio less than 30 mg/g  • Abstain from smoking and alcohol use  • Annual dilated eye exam  • Annual diabetic foot exam     Education performed during this visit includes long term diabetic complications and routine health maintenance including annual eye exams with ophthalmology, appropriate dental hygiene, and foot care. Medications, including side effects and compliance discussed carefully and Hypoglycemia management and prevention reviewed.   Discussed the nature of the disease including, risks, complications,  implications, management, safe and proper use of medications. Encouraged therapeutic lifestyle changes including low calorie diet with plenty of fruits and vegetables, daily exercise, medication compliance, and keeping scheduled follow up appointments as indicated. Encouraged patient to have appointment for complete physical, fasting labs, appropriate screenings, and immunizations on an annual basis.    2. Encounter for screening colonoscopy  -     Ambulatory Referral For Screening Colonoscopy    3. Essential hypertension        - Controlled based off of in office reading although home readings are uncontrolled. Will Continue with lisinopril as prescribed for the next two weeks and have patient continue to monitor BP. Discussed goal was less than 130/80. If uncontrolled will increase lisinopril to 20 mg/day.     4. Hospital discharge follow-up      - Patient appears to be doing well since discharge. Encouraged patient to attend neurology follow-up as scheduled.

## 2022-04-05 ENCOUNTER — HOSPITAL ENCOUNTER (OUTPATIENT)
Dept: DIABETES SERVICES | Facility: HOSPITAL | Age: 47
Setting detail: RECURRING SERIES
Discharge: HOME | End: 2022-04-05

## 2022-04-06 ENCOUNTER — READMISSION MANAGEMENT (OUTPATIENT)
Dept: CALL CENTER | Facility: HOSPITAL | Age: 47
End: 2022-04-06

## 2022-04-06 NOTE — OUTREACH NOTE
"Stroke Week 2 Survey    Flowsheet Row Responses   [facility] [place] patient discharged from? [place]   Does the patient have one of the following disease processes/diagnoses(primary or secondary)? Stroke (TIA)   Week 2 attempt successful? Yes   Call start time 1723   Call end time 1725   Discharge diagnosis Stroke,  Essential hypertension,  Uncontrolled type 2 diabetes mellitus with hyperglycemia    Is the patient taking all medications as directed (includes completed medication regime)? Yes   Does the patient have a primary care provider?  Yes   Has the patient kept scheduled appointments due by today? Yes   Does the patient require any assistance with activities of daily living such as eating, bathing, dressing, walking, etc.? No   Does the patient have any residual symptoms from stroke/TIA? Yes   Residual symptoms comments Has some tingling in hand and leg.     What is the patient's perception of their health status since discharge? Improving   Is the patient able to teach back FAST for Stroke? Yes   Is the patient/caregiver able to teach back the risk factors for a stroke? High blood pressure-goal below 120/80   Is the patient/caregiver able to teach back signs and symptoms related to disease process for when to call PCP? Yes   Is the patient/caregiver able to teach back signs and symptoms related to disease process for when to call 911? Yes   If the patient is a current smoker, are they able to teach back resources for cessation? Not a smoker   Is the patient/caregiver able to teach back the hierarchy of who to call/visit for symptoms/problems? PCP, Specialist, Home health nurse, Urgent Care, ED, 911 Yes   Additional teach back comments States he is doing \"good\".  Headaches are gone.  Getting PT for weakness in leg and tingling in hand.  Has been monitoring bp morning and evening.  Has been also montior blood sugars.   Week 2 call completed? Yes   Wrap up additional comments Denies questions or needs at this " time.          SELENE CORDOVA - Licensed Nurse

## 2022-04-15 ENCOUNTER — READMISSION MANAGEMENT (OUTPATIENT)
Dept: CALL CENTER | Facility: HOSPITAL | Age: 47
End: 2022-04-15

## 2022-04-15 NOTE — OUTREACH NOTE
Stroke Week 3 Survey    Flowsheet Row Responses   [facility] discharged from? [place]   Does the patient have one of the following disease processes/diagnoses(primary or secondary)? Stroke (TIA)   Week 3 attempt successful? Yes   Call start time 1257   Rescheduled Rescheduled-pt requested          ATIF GODINEZ - Registered Nurse

## 2022-04-19 ENCOUNTER — LAB (OUTPATIENT)
Dept: LAB | Facility: HOSPITAL | Age: 47
End: 2022-04-19

## 2022-04-19 ENCOUNTER — OFFICE VISIT (OUTPATIENT)
Dept: INTERNAL MEDICINE | Facility: CLINIC | Age: 47
End: 2022-04-19

## 2022-04-19 ENCOUNTER — READMISSION MANAGEMENT (OUTPATIENT)
Dept: CALL CENTER | Facility: HOSPITAL | Age: 47
End: 2022-04-19

## 2022-04-19 VITALS
RESPIRATION RATE: 18 BRPM | DIASTOLIC BLOOD PRESSURE: 80 MMHG | HEART RATE: 85 BPM | BODY MASS INDEX: 36.05 KG/M2 | OXYGEN SATURATION: 97 % | TEMPERATURE: 97.5 F | WEIGHT: 249 LBS | SYSTOLIC BLOOD PRESSURE: 130 MMHG

## 2022-04-19 DIAGNOSIS — Z12.5 SCREENING PSA (PROSTATE SPECIFIC ANTIGEN): ICD-10-CM

## 2022-04-19 DIAGNOSIS — E11.65 TYPE 2 DIABETES MELLITUS WITH HYPERGLYCEMIA, WITHOUT LONG-TERM CURRENT USE OF INSULIN: ICD-10-CM

## 2022-04-19 DIAGNOSIS — I10 ESSENTIAL HYPERTENSION: ICD-10-CM

## 2022-04-19 DIAGNOSIS — N52.9 ERECTILE DYSFUNCTION, UNSPECIFIED ERECTILE DYSFUNCTION TYPE: ICD-10-CM

## 2022-04-19 DIAGNOSIS — L98.9 SKIN ABNORMALITIES: ICD-10-CM

## 2022-04-19 DIAGNOSIS — E11.65 UNCONTROLLED TYPE 2 DIABETES MELLITUS WITH HYPERGLYCEMIA: Primary | ICD-10-CM

## 2022-04-19 DIAGNOSIS — Z00.00 ANNUAL PHYSICAL EXAM: ICD-10-CM

## 2022-04-19 LAB
ALBUMIN SERPL-MCNC: 4.9 G/DL (ref 3.5–5.2)
ALBUMIN UR-MCNC: <1.2 MG/DL
ALBUMIN/GLOB SERPL: 2 G/DL
ALP SERPL-CCNC: 81 U/L (ref 39–117)
ALT SERPL W P-5'-P-CCNC: 38 U/L (ref 1–41)
ANION GAP SERPL CALCULATED.3IONS-SCNC: 12.7 MMOL/L (ref 5–15)
AST SERPL-CCNC: 22 U/L (ref 1–40)
BASOPHILS # BLD AUTO: 0.05 10*3/MM3 (ref 0–0.2)
BASOPHILS NFR BLD AUTO: 0.5 % (ref 0–1.5)
BILIRUB SERPL-MCNC: 0.5 MG/DL (ref 0–1.2)
BUN SERPL-MCNC: 17 MG/DL (ref 6–20)
BUN/CREAT SERPL: 14.5 (ref 7–25)
CALCIUM SPEC-SCNC: 9.9 MG/DL (ref 8.6–10.5)
CHLORIDE SERPL-SCNC: 100 MMOL/L (ref 98–107)
CO2 SERPL-SCNC: 24.3 MMOL/L (ref 22–29)
CREAT SERPL-MCNC: 1.17 MG/DL (ref 0.76–1.27)
CREAT UR-MCNC: 146 MG/DL
DEPRECATED RDW RBC AUTO: 40.4 FL (ref 37–54)
EGFRCR SERPLBLD CKD-EPI 2021: 77.4 ML/MIN/1.73
EOSINOPHIL # BLD AUTO: 0.14 10*3/MM3 (ref 0–0.4)
EOSINOPHIL NFR BLD AUTO: 1.4 % (ref 0.3–6.2)
ERYTHROCYTE [DISTWIDTH] IN BLOOD BY AUTOMATED COUNT: 13 % (ref 12.3–15.4)
GLOBULIN UR ELPH-MCNC: 2.4 GM/DL
GLUCOSE SERPL-MCNC: 205 MG/DL (ref 65–99)
HCT VFR BLD AUTO: 44.7 % (ref 37.5–51)
HCV AB SER DONR QL: NORMAL
HGB BLD-MCNC: 15.4 G/DL (ref 13–17.7)
IMM GRANULOCYTES # BLD AUTO: 0.03 10*3/MM3 (ref 0–0.05)
IMM GRANULOCYTES NFR BLD AUTO: 0.3 % (ref 0–0.5)
LYMPHOCYTES # BLD AUTO: 1.3 10*3/MM3 (ref 0.7–3.1)
LYMPHOCYTES NFR BLD AUTO: 13.3 % (ref 19.6–45.3)
MCH RBC QN AUTO: 29.7 PG (ref 26.6–33)
MCHC RBC AUTO-ENTMCNC: 34.5 G/DL (ref 31.5–35.7)
MCV RBC AUTO: 86.1 FL (ref 79–97)
MICROALBUMIN/CREAT UR: NORMAL MG/G{CREAT}
MONOCYTES # BLD AUTO: 0.7 10*3/MM3 (ref 0.1–0.9)
MONOCYTES NFR BLD AUTO: 7.2 % (ref 5–12)
NEUTROPHILS NFR BLD AUTO: 7.55 10*3/MM3 (ref 1.7–7)
NEUTROPHILS NFR BLD AUTO: 77.3 % (ref 42.7–76)
NRBC BLD AUTO-RTO: 0 /100 WBC (ref 0–0.2)
PLATELET # BLD AUTO: 235 10*3/MM3 (ref 140–450)
PMV BLD AUTO: 11.2 FL (ref 6–12)
POTASSIUM SERPL-SCNC: 4.5 MMOL/L (ref 3.5–5.2)
PROT SERPL-MCNC: 7.3 G/DL (ref 6–8.5)
RBC # BLD AUTO: 5.19 10*6/MM3 (ref 4.14–5.8)
SODIUM SERPL-SCNC: 137 MMOL/L (ref 136–145)
TSH SERPL DL<=0.05 MIU/L-ACNC: 1.11 UIU/ML (ref 0.27–4.2)
WBC NRBC COR # BLD: 9.77 10*3/MM3 (ref 3.4–10.8)

## 2022-04-19 PROCEDURE — 80053 COMPREHEN METABOLIC PANEL: CPT

## 2022-04-19 PROCEDURE — 84443 ASSAY THYROID STIM HORMONE: CPT

## 2022-04-19 PROCEDURE — 85025 COMPLETE CBC W/AUTO DIFF WBC: CPT

## 2022-04-19 PROCEDURE — 82043 UR ALBUMIN QUANTITATIVE: CPT

## 2022-04-19 PROCEDURE — 99214 OFFICE O/P EST MOD 30 MIN: CPT

## 2022-04-19 PROCEDURE — 82570 ASSAY OF URINE CREATININE: CPT

## 2022-04-19 PROCEDURE — 86803 HEPATITIS C AB TEST: CPT

## 2022-04-19 PROCEDURE — 99396 PREV VISIT EST AGE 40-64: CPT

## 2022-04-19 RX ORDER — SILDENAFIL 50 MG/1
50 TABLET, FILM COATED ORAL DAILY PRN
Qty: 9 TABLET | Refills: 2 | Status: SHIPPED | OUTPATIENT
Start: 2022-04-19 | End: 2022-04-19

## 2022-04-19 RX ORDER — LISINOPRIL 20 MG/1
20 TABLET ORAL
Qty: 30 TABLET | Refills: 0 | Status: SHIPPED | OUTPATIENT
Start: 2022-04-19 | End: 2022-05-13 | Stop reason: SDUPTHER

## 2022-04-19 NOTE — PROGRESS NOTES
"Chief Complaint  Annual Exam    Kee Gutierrez presents to [facility]    HTN- Taking lisinopril each day with no omitted doses and no side effects reported. Has been monitoring BP at home with systolic's averaging in the 130's and diastolic's in the 80's. Not currently doing anything for exercise. Has been monitoring Na intake the diet. Currently asymptomatic.     DM- Taking metformin and januvia each day with no omitted doses and no side effects reported. Has been monitoring FPG with readings averaging in the 140's. No physical activity. Has been reducing carbohydrate intake in the diet. Denies polydipsia, polyphagia and poor wound healing. Endordses neuropathies and polyuria.     ED- Able to achieve and erection but has difficulty maintaining it. Does not wake up with erections. Would like to trial medications for this today.     The patient is being seen for a health maintenance evaluation.  The last health maintenance was >5 year(s) ago.    Social History  Kee  does not smoke cigarettes.   He drinks no alcohol.  He does not use illicit drugs.    General History  Kee  does not have regular dental visits.  He does not complain of vision problems. Last eye exam was 2020.   Immunizations are up to date. The patient needs the following immunizations: n/a     Lifestyle  Kee  consumes a in general, a \"healthy\" diet  .  He exercises rarely.    Reproductive Health  Kee  is sexually active. His contraceptive plan is no method.   He does have erectile dysfunction. Has not tried anything.     Screening  Last PSA was never.  Last prostate exam was never. Family history of prostate cancer: No.   Last testicular exam was self-performed monthly.  Last colonoscopy was never. Family history of colon cancer: No.     Health Maintenance -   Wearing seatbelt: yes  Smoke detectors in home: yes    Patient denies fever, chills, headache, ear pain, sore throat, shortness of " air, cough, wheezing, chest pain, palpitations, abdominal pain, nausea, vomiting, diarrhea, dysuria, blood in stool or urine. Mood is stable. Eating and drinking as usual. No unexplained weight loss or gain.       Subjective         Health Maintenance   Topic   • URINE MICROALBUMIN    • COLORECTAL CANCER SCREENING    • COVID-19 Vaccine (2 - Moderna 3-dose series)   • HEPATITIS C SCREENING    • DIABETIC EYE EXAM    • Pneumococcal Vaccine 0-64 (1 - PCV)   • Hepatitis B (1 of 3 - Risk 3-dose series)   • INFLUENZA VACCINE    • HEMOGLOBIN A1C    • LIPID PANEL    • DIABETIC FOOT EXAM    • ANNUAL PHYSICAL    • TDAP/TD VACCINES (2 - Td or Tdap)       [facility]  The following portions of the patient's history were reviewed and updated as appropriate: allergies, current medications, past family history, past medical history, past social history, past surgical history and problem list.     Past Medical History:   Diagnosis Date   • Diabetes mellitus (HCC) 3/26/2022   • Erectile dysfunction 1/1/2022   • Headache 3/26/2022   • Hyperlipidemia 3/36/2022   • Hypertension    • Stroke (HCC) 3/26/2022      No Known Allergies   Social History     Tobacco Use   • Smoking status: Never Smoker   • Smokeless tobacco: Never Used   Substance Use Topics   • Alcohol use: Not Currently   • Drug use: Never     History reviewed. No pertinent surgical history.   Family History   Problem Relation Age of Onset   • Heart disease Father    • Cancer Mother         uterian cancer   • Hyperlipidemia Maternal Grandfather    • Thyroid disease Maternal Grandfather    • Hyperlipidemia Maternal Grandmother    • Stroke Maternal Grandmother         In her 60's had some mini strokes   • Thyroid disease Maternal Grandmother    • Cancer Paternal Grandfather         lung cancer   • Diabetes Paternal Grandfather    • Asthma Son         very mild   • Learning disabilities Son         Dyslexia   • Learning disabilities Son         Dyslexia         Current Outpatient  Medications:   •  aspirin 81 MG chewable tablet, Chew 1 tablet Daily. X 21 days. Then transition to Aspirin 325mg by mouth daily, Disp: 21 tablet, Rfl: 0  •  atorvastatin (LIPITOR) 80 MG tablet, Take 1 tablet by mouth Every Night., Disp: 30 tablet, Rfl: 5  •  clopidogrel (PLAVIX) 75 MG tablet, Take 1 tablet by mouth Daily for 21 days., Disp: 21 tablet, Rfl: 0  •  glucose blood test strip, Use to check blood glucose 2-4 times per day Test strips for One Touch Verio Reflect glucometer, Disp: 100 each, Rfl: 12  •  Lancets Ultra Thin 30G misc, Use to check blood glucose 2-4 times per day, Disp: 100 each, Rfl: 5  •  lisinopril (PRINIVIL,ZESTRIL) 20 MG tablet, Take 1 tablet by mouth Daily., Disp: 30 tablet, Rfl: 0  •  metFORMIN (GLUCOPHAGE) 1000 MG tablet, Take 1 tablet by mouth 2 (Two) Times a Day With Meals., Disp: 60 tablet, Rfl: 5  •  SITagliptin (JANUVIA) 100 MG tablet, Take 1 tablet by mouth Daily., Disp: 90 tablet, Rfl: 1  •  empagliflozin (Jardiance) 10 MG tablet tablet, Take 1 tablet by mouth Daily., Disp: 30 tablet, Rfl: 0    Review of Systems   Constitutional: Negative for appetite change, chills, diaphoresis, fatigue, fever and unexpected weight loss.   HENT: Negative for nosebleeds, rhinorrhea, sinus pressure, sore throat, swollen glands, trouble swallowing and voice change.    Eyes: Negative for blurred vision, double vision, photophobia and visual disturbance.   Respiratory: Negative for apnea, cough, chest tightness, shortness of breath and wheezing.    Cardiovascular: Negative for chest pain, palpitations and leg swelling.   Gastrointestinal: Negative for abdominal pain, blood in stool, constipation, diarrhea, nausea, vomiting and GERD.   Genitourinary: Positive for erectile dysfunction. Negative for decreased urine volume, difficulty urinating, dysuria and flank pain.   Musculoskeletal: Negative for arthralgias and myalgias.   Skin: Negative for color change, rash and skin lesions.   Neurological:  Negative for dizziness, syncope, facial asymmetry, speech difficulty, weakness, light-headedness and headache.   Psychiatric/Behavioral: Negative for behavioral problems, dysphoric mood, hallucinations, self-injury, sleep disturbance, suicidal ideas and depressed mood. The patient is not nervous/anxious.        Objective   Vital Signs  /80 (BP Location: Left arm, Patient Position: Sitting)   Pulse 85   Temp 97.5 °F (36.4 °C)   Resp 18   Wt 113 kg (249 lb)   SpO2 97%   BMI 36.05 kg/m²     Physical Exam  Constitutional:       General: He is not in acute distress.     Appearance: Normal appearance. He is normal weight. He is not ill-appearing or toxic-appearing.   HENT:      Head: Normocephalic.      Right Ear: Hearing, tympanic membrane, ear canal and external ear normal.      Left Ear: Hearing, tympanic membrane, ear canal and external ear normal.      Nose: Nose normal. No congestion or rhinorrhea.      Mouth/Throat:      Mouth: Mucous membranes are moist.      Pharynx: Oropharynx is clear. No oropharyngeal exudate or posterior oropharyngeal erythema.   Eyes:      General: Lids are normal. No allergic shiner, visual field deficit or scleral icterus.     Extraocular Movements: Extraocular movements intact.      Right eye: No nystagmus.      Left eye: No nystagmus.      Conjunctiva/sclera: Conjunctivae normal.      Pupils: Pupils are equal, round, and reactive to light.   Neck:      Thyroid: No thyroid mass, thyromegaly or thyroid tenderness.      Vascular: No carotid bruit.      Trachea: Trachea and phonation normal.   Cardiovascular:      Rate and Rhythm: Normal rate and regular rhythm.      Chest Wall: PMI is not displaced. No thrill.      Pulses: Normal pulses.           Radial pulses are 2+ on the right side and 2+ on the left side.        Dorsalis pedis pulses are 2+ on the right side and 2+ on the left side.        Posterior tibial pulses are 2+ on the right side and 2+ on the left side.      Heart  sounds: No murmur heard.    No gallop. No S3 sounds.   Pulmonary:      Effort: Pulmonary effort is normal.      Breath sounds: Normal breath sounds.   Chest:   Breasts:      Right: No axillary adenopathy or supraclavicular adenopathy.      Left: No axillary adenopathy or supraclavicular adenopathy.       Abdominal:      General: Abdomen is flat. Bowel sounds are normal.      Palpations: Abdomen is soft.      Tenderness: There is no abdominal tenderness. There is no guarding or rebound.      Hernia: No hernia is present.   Musculoskeletal:         General: Normal range of motion.      Cervical back: Normal range of motion and neck supple. No rigidity.      Right lower leg: No edema.      Left lower leg: No edema.      Right foot: Normal range of motion. No deformity, bunion, Charcot foot, foot drop or prominent metatarsal heads.      Left foot: Normal range of motion. No deformity, bunion, Charcot foot, foot drop or prominent metatarsal heads.   Feet:      Right foot:      Protective Sensation: 9 sites tested. 9 sites sensed.      Skin integrity: Skin integrity normal.      Toenail Condition: Right toenails are normal.      Left foot:      Protective Sensation: 9 sites tested. 9 sites sensed.      Skin integrity: Skin integrity normal.      Toenail Condition: Left toenails are normal.   Lymphadenopathy:      Head:      Right side of head: No submental, submandibular, tonsillar, preauricular, posterior auricular or occipital adenopathy.      Left side of head: No submental, submandibular, tonsillar, preauricular, posterior auricular or occipital adenopathy.      Cervical: No cervical adenopathy.      Upper Body:      Right upper body: No supraclavicular, axillary, pectoral or epitrochlear adenopathy.      Left upper body: No supraclavicular, axillary, pectoral or epitrochlear adenopathy.   Skin:     General: Skin is warm and dry.      Capillary Refill: Capillary refill takes less than 2 seconds.      Findings: No  abrasion.      Nails: There is no clubbing.   Neurological:      General: No focal deficit present.      Mental Status: He is alert and oriented to person, place, and time.      GCS: GCS eye subscore is 4. GCS verbal subscore is 5. GCS motor subscore is 6.      Cranial Nerves: Cranial nerves are intact.      Motor: Motor function is intact.      Coordination: Coordination is intact.      Deep Tendon Reflexes: Reflexes are normal and symmetric.   Psychiatric:         Attention and Perception: Attention and perception normal.         Mood and Affect: Mood and affect normal.         Speech: Speech normal.         Behavior: Behavior normal. Behavior is cooperative.         Thought Content: Thought content normal.         Cognition and Memory: Cognition and memory normal.         Judgment: Judgment normal.          Result Review :     The following data was reviewed by: HECTOR Pena on 04/19/2022:  [facility] 3/26/22 3/26/22 3/26/22 3/26/22 3/27/22    0811 0811 0811 0817    Glucose   295 (A)  205 (A)   BUN   19  14   Creatinine   1.01 0.90 1.02   Sodium   134 (A)  136   Potassium   3.9  3.9   Chloride   98  100   Calcium   9.8  9.3   Albumin   4.70     Total Bilirubin   0.6     Alkaline Phosphatase   74     AST (SGOT)  20 19     ALT (SGPT) 28  30     (A) Abnormal value       Comments are available for some flowsheets but are not being displayed.             Assessment and Plan    1. Uncontrolled type 2 diabetes mellitus with hyperglycemia (HCC)  - empagliflozin (Jardiance) 10 MG tablet tablet; Take 1 tablet by mouth Daily.  Dispense: 30 tablet; Refill: 0  - Uncontrolled based on home readings. Will add on jardiance. Continue with metformin and sitagliptin as prescribed. Discussed need for increased physical activity. Discussed need to drink at least 5-6 bottles of water per day while on jardiance. Will follow-up in 4 weeks to evaluate control based on home glucose readings.     2. Essential hypertension  -  lisinopril (PRINIVIL,ZESTRIL) 20 MG tablet; Take 1 tablet by mouth Daily.  Dispense: 30 tablet; Refill: 0  - Uncontrolled. Based on home readings. Will increase lisinopril to 20 mg per day. Encouraged to continue to monitor BP. Discussed goal was less than 130/80.     3. Erectile dysfunction, unspecified erectile dysfunction type  - Uncontrolled. Discussed that I usually avoid Viagra in patient's the first 6 months after a stroke but if neurology okay's it will provide prescription. Patient is encouraged to reach out if neurology okay's the medication.     4. Skin abnormalities  - Ambulatory Referral to Dermatology    5. Annual physical exam  - PSA Screen; Future  - Nutrition and activity goals reviewed including: mainly water to drink, limit white flour/processed sugar; increase high protein, high fiber carbs, good breakfast, working toward 150 mins cardio per week, resistance training 2x/week.    The patient is here for a health maintenance visit.  Screening lab work is ordered.  Immunizations are reported as current.  Advice and education is given regarding nutrition, aerobic exercise, routine dental evaluations, routine eye exams, reproductive health, cardiovascular risk reduction.  Further recommendations after lab evaluation.  Annual wellness evaluations recommended.     I discussed the patients findings and my recommendations with patient.  Patient was encouraged to keep me informed of any acute changes or any new concerning symptoms.  Patient voiced understanding of all instructions and denied further questions.    6. Screening PSA (prostate specific antigen)  - PSA Screen; Future      Follow Up     Return in about 1 month (around 5/19/2022) for HTN, DM .    Patient was given instructions and counseling regarding his condition or for health maintenance advice. Please see specific information pulled into the AVS if appropriate.    Part of this note may be an electronic transcription/translation of spoken  language to printed text using the Dragon Dictation System.    Electronically signed by:   HECTOR Pena  04/19/2022

## 2022-04-19 NOTE — OUTREACH NOTE
Medical Week 3 Survey    Flowsheet Row Responses   [facility] facility patient discharged from? [facility]   Does the patient have one of the following disease processes/diagnoses(primary or secondary)? Stroke (TIA)          ZOEY GILLIAM - Registered Nurse

## 2022-04-25 ENCOUNTER — READMISSION MANAGEMENT (OUTPATIENT)
Dept: CALL CENTER | Facility: HOSPITAL | Age: 47
End: 2022-04-25

## 2022-04-25 DIAGNOSIS — E11.65 TYPE 2 DIABETES MELLITUS WITH HYPERGLYCEMIA, WITHOUT LONG-TERM CURRENT USE OF INSULIN: ICD-10-CM

## 2022-04-25 NOTE — OUTREACH NOTE
Stroke Week 3 Survey    Flowsheet Row Responses   [facility] patient discharged from? [facility]   Does the patient have one of the following disease processes/diagnoses(primary or secondary)? Stroke (TIA)   Week 3 attempt successful? Yes   Call start time 1156   Call end time 1202   Discharge diagnosis Stroke,  Essential hypertension,  Uncontrolled type 2 diabetes mellitus with hyperglycemia    Is patient permission given to speak with other caregiver? Yes   List who call center can speak with wife   Meds reviewed with patient/caregiver? Yes   Is the patient taking all medications as directed (includes completed medication regime)? Yes   Has the patient kept scheduled appointments due by today? Yes   What is the Home health agency?  [facility]   Psychosocial issues? No   Does the patient require any assistance with activities of daily living such as eating, bathing, dressing, walking, etc.? No   Does the patient have any residual symptoms from stroke/TIA? Yes   Residual symptoms comments left hand tingling remains.    Does the patient understand the diet ordered at discharge? Yes   Comments Monitoring BP and glucose at home. 120-130/70-80,  gluc 120-130,     What is the patient's perception of their health status since discharge? Returned to baseline/stable   Is the patient/caregiver able to teach back the risk factors for a stroke? Diabetes   Week 3 call completed? Yes   Revoked No further contact(revokes)-requires comment   Is the patient interested in additional calls from an ambulatory ?  NOTE:  applies to high risk patients requiring additional follow-up. No   Graduated/Revoked comments dominik KENDRICK - Registered Nurse

## 2022-04-28 NOTE — PROGRESS NOTES
New Patient Office Visit      Encounter Date: 2022   Patient Name: Kee Gutierrez  : 1975   MRN: [id number]   PCP: Brett Dale APRN    Referring Provider: No ref. provider found     Chief Complaint:    Chief Complaint   Patient presents with   • Follow-up   • Stroke       History of Present Illness: Kee Gutierrez is a 46 y.o. male with known medical diagnosis of DM type II, ED, headaches, hyperlipidemia, hypertension who presented to [facility] on 3/26/2022 with concerns of left-sided incoordination and paresthesia.  MRI at this time revealed an acute to subacute infarct in the right thalamic area.  Patient presents to stroke clinic today for hospital follow-up.    Patient today states he is doing well, he does mention he continues to have left hand numbness/tingling and that his fine motor movements in his left hand are not quite to baseline, he states he is following with outpatient PT for this. Patient states that he is back to work and does not use an assistive ambulation device. He states he has been compliant with his medications without any issue.  Denies any new onset of strokelike symptoms.     Patient states that he had a phone meeting with diabetic educator, he has been checking his blood glucoses at home, they were previously slightly elevated but his medications have since been adjusted and his fasting glucoses have been in the 110s.     Patient also reports checking blood pressure at home twice a day.  His systolic has been 110s to 120s.     Patient does voice concern today about starting ED meds, notes that he began having issues with the ED prior to his stroke.  He has followed with Dr. Anderson and he asked that we evaluate for use of ED medication.  See plan below.      Stroke Risk Factors: Hypertension, DM type II, hyperlipidemia, previous stroke      Subjective      Review of Systems:   Review of Systems   Endocrine: Positive for polyuria.   Neurological: Positive for  dizziness, weakness, light-headedness and numbness.   All other systems reviewed and are negative.      Past Medical History:   Past Medical History:   Diagnosis Date   • Diabetes mellitus (HCC) 3/26/2022   • Erectile dysfunction 1/1/2022   • Headache 3/26/2022   • Hyperlipidemia 3/36/2022   • Hypertension    • Stroke (HCC) 3/26/2022       Past Surgical History: History reviewed. No pertinent surgical history.    Family History:   Family History   Problem Relation Age of Onset   • Heart disease Father    • Cancer Mother         uterian cancer   • Hyperlipidemia Maternal Grandfather    • Thyroid disease Maternal Grandfather    • Dementia Maternal Grandfather    • Hyperlipidemia Maternal Grandmother    • Stroke Maternal Grandmother         In her 60's had some mini strokes   • Thyroid disease Maternal Grandmother    • Dementia Maternal Grandmother    • Cancer Paternal Grandfather         lung cancer   • Diabetes Paternal Grandfather    • Asthma Son         very mild   • Learning disabilities Son         Dyslexia   • Learning disabilities Son         Dyslexia       Social History:   Social History     Socioeconomic History   • Marital status:    Tobacco Use   • Smoking status: Never Smoker   • Smokeless tobacco: Never Used   Vaping Use   • Vaping Use: Never used   Substance and Sexual Activity   • Alcohol use: Not Currently   • Drug use: Never   • Sexual activity: Yes     Partners: Female     Birth control/protection: None     Comment:  - wife has already gone through menopause       Medications:     Current Outpatient Medications:   •  aspirin 81 MG chewable tablet, Chew 1 tablet Daily. X 21 days. Then transition to Aspirin 325mg by mouth daily, Disp: 21 tablet, Rfl: 0  •  atorvastatin (LIPITOR) 80 MG tablet, Take 1 tablet by mouth Every Night., Disp: 30 tablet, Rfl: 5  •  empagliflozin (Jardiance) 10 MG tablet tablet, Take 1 tablet by mouth Daily., Disp: 30 tablet, Rfl: 0  •  glucose blood test strip,  Use to check blood glucose 2-4 times per day Test strips for One Touch Verio Reflect glucometer, Disp: 100 each, Rfl: 12  •  Lancets Ultra Thin 30G misc, Use to check blood glucose 2-4 times per day, Disp: 100 each, Rfl: 5  •  lisinopril (PRINIVIL,ZESTRIL) 20 MG tablet, Take 1 tablet by mouth Daily., Disp: 30 tablet, Rfl: 0  •  metFORMIN (GLUCOPHAGE) 1000 MG tablet, Take 1 tablet by mouth 2 (Two) Times a Day With Meals., Disp: 60 tablet, Rfl: 1  •  SITagliptin (JANUVIA) 100 MG tablet, Take 1 tablet by mouth Daily., Disp: 90 tablet, Rfl: 1    Allergies:   No Known Allergies    Objective     Physical Exam:  Vital Signs:   Vitals:    04/29/22 0958   BP: 128/72   Pulse: 76   Temp: 98.2 °F (36.8 °C)   SpO2: 98%   Weight: 112 kg (247 lb)     Body mass index is 35.76 kg/m².     Physical Exam  Vitals reviewed.   Constitutional:       General: He is not in acute distress.     Appearance: He is not ill-appearing.   HENT:      Head: Normocephalic and atraumatic.      Mouth/Throat:      Mouth: Mucous membranes are moist.      Pharynx: Oropharynx is clear.   Eyes:      Extraocular Movements: Extraocular movements intact.      Pupils: Pupils are equal, round, and reactive to light.   Cardiovascular:      Rate and Rhythm: Normal rate and regular rhythm.      Pulses: Normal pulses.      Heart sounds: No murmur heard.  Pulmonary:      Effort: Pulmonary effort is normal. No respiratory distress.   Abdominal:      Palpations: Abdomen is soft.   Musculoskeletal:      Right lower leg: No edema.      Left lower leg: No edema.   Skin:     General: Skin is warm and dry.   Neurological:      General: No focal deficit present.      Mental Status: He is oriented to person, place, and time. Mental status is at baseline.      Cranial Nerves: No cranial nerve deficit.      Sensory: No sensory deficit.      Motor: No weakness.      Coordination: Coordination is intact. Coordination normal.      Gait: Gait normal.      Deep Tendon Reflexes:  Strength normal.      Reflex Scores:       Brachioradialis reflexes are 2+ on the right side and 2+ on the left side.       Patellar reflexes are 2+ on the right side and 2+ on the left side.  Psychiatric:         Mood and Affect: Mood normal.         Speech: Speech normal.         Behavior: Behavior normal.         Thought Content: Thought content normal.         Judgment: Judgment normal.       Neurological Exam  Mental Status  Awake, alert and oriented to person, place and time. Oriented to person, place, time and situation. Oriented to person, place, and time. Speech is normal. Language is fluent with no aphasia. Fund of knowledge is appropriate for level of education.    Cranial Nerves  CN II: Right normal visual field. Left normal visual field.  CN III, IV, VI: Extraocular movements intact bilaterally. Pupils equal round and reactive to light bilaterally.  CN V:  Right: Facial sensation is normal.  Left: Facial sensation is normal on the left.  CN VII: Full and symmetric facial movement.  CN IX, X: Palate elevates symmetrically  CN XI:  Right: Trapezius strength is normal.  Left: Trapezius strength is normal.  CN XII: Tongue midline without atrophy or fasciculations.    Motor   Normal muscle tone. Strength is 5/5 throughout all four extremities.    Sensory  Sensation is intact to light touch, pinprick, vibration and proprioception in all four extremities.    Reflexes                                            Right                      Left  Brachioradialis                    2+                         2+  Patellar                                2+                         2+    Coordination    Finger-to-nose, rapid alternating movements and heel-to-shin normal bilaterally without dysmetria.    Gait  Normal casual, toe, heel and tandem gait. Normal gait.       NIH Stroke Scale    1a  Level of consciousness: 0=alert; keenly responsive   1b. LOC questions:  0=Answers both questions correctly    1c. LOC commands:  0=Performs both tasks correctly   2.  Best Gaze: 0=normal   3. Visual: 0=No visual loss   4. Facial Palsy: 0=Normal symmetric movement   5a. Motor left arm: 0=No drift, limb holds 90 (or 45) degrees for full 10 seconds   5b.  Motor right arm: 0=No drift, limb holds 90 (or 45) degrees for full 10 seconds   6a. Motor left le=No drift, limb holds 90 (or 45) degrees for full 10 seconds   6b  Motor right le=No drift, limb holds 90 (or 45) degrees for full 10 seconds   7. Limb Ataxia: 0=Absent   8.  Sensory: 0=Normal; no sensory loss   9. Best Language:  0=No aphasia, normal   10. Dysarthria: 0=Normal   11. Extinction and Inattention: 0=No abnormality    Total:   0         Modified Pamela Score: 0        0  No Symptoms    1 No significant disability. Able to carry out all usual activities, despite some symptoms.    2 Slight disability. Able to look after own affairs without assistance, but unable to carry out all previous activities.    3 Moderate disability. Requires some help, but able to walk unassisted.    4 Moderately severe disability. Unable to attend to own bodily needs without assistance, and unable to walk unassisted.    5 Severe disability. Requires constant nursing care and attention, bedridden, incontinent.    6 Dead        PHQ-9 Depression Screening  Little interest or pleasure in doing things? 0-->not at all   Feeling down, depressed, or hopeless? 0-->not at all   Trouble falling or staying asleep, or sleeping too much?     Feeling tired or having little energy?     Poor appetite or overeating?     Feeling bad about yourself - or that you are a failure or have let yourself or your family down?     Trouble concentrating on things, such as reading the newspaper or watching television?     Moving or speaking so slowly that other people could have noticed? Or the opposite - being so fidgety or restless that you have been moving around a lot more than usual?     Thoughts that you would be better off  dead, or of hurting yourself in some way?     PHQ-9 Total Score 0   If you checked off any problems, how difficult have these problems made it for you to do your work, take care of things at home, or get along with other people?             Imaging Reviewed:   03/26/2022  MRI Brain without contrast  1.  Acute to subacute infarct right thalamic area  2.  Scattered T2 signal changes involving both cerebral hemispheres as  well as the michael which could reflect more chronic small vessel ischemic  change.     CTA head neck/CT perfusion  1.  No significant perfusion abnormality.  2.  No significant carotid stenosis by NASCET criteria  3.  No large vessel occlusion intracranially.  4.  Some nonspecific mediastinal lymph nodes are suggested.  5.  Degenerative change cervical spine      Laboratory Results:   3/27/2022  Lipid panel total cholesterol 222 triglycerides 115 HDL 36   Hemoglobin A1c 9.4    4/19/2022  H&H 15.4/44.7 platelets 235  ALT/AST 38/22  BUN 17 creatinine 1.17      Assessment / Plan      Assessment/Plan:   Diagnoses and all orders for this visit:    1. History of stroke (Primary)        - Patient doing well, still notes numbness/tingling sensation in left hand  as well as decreased fine motor skills, continue following with  outpatient PT        - Continue ASA 325mg daily        - Patient with questions about starting ED medication, Discussed with  Dr. Paz. From a stroke standpoint, patient is cleared to start ED  medication, patient should avoid hypotension and drink adequate fluids        - Follow up in stroke clinic in 3 months    2. Essential hypertension        - BP in clinic today 128/72        - Patient checks BP at home BID, SBP 110s-120s, goal <130/80        - Management per primary care team    3. Uncontrolled type 2 diabetes mellitus with hyperglycemia (HCC)        - Most recent A1C 9.4, goal for secondary stroke prevention <7        - Patient had phone consult with Diabetic educator  and has been  monitoring his glucose at home, fasting glucoses were initially high  but his doctor was able to make medication adjustments and as of  recently his fasting BGs have been 110s        - Management per primary care team    4. Hyperlipidemia        - Most recent , goal <70 for secondary stroke prevention        - Continue Lipitor 80mg nightly        - Encouraged patient to choose healthy diet options and increase  activity as tolerated        - Management per primary care team    Discussed the importance of medication compliance and lifestyle modifications (adequate blood pressure control, adequate control of hyperlipidemia, adequate glycemic control, increase physical activity, and healthy diet) to help reduce the risk of future cerebrovascular events.  Also discussed the signs symptoms that would warrant the patient return back to the emergency department including unilateral weakness, unilateral numbness, visual disturbances, loss of balance, speech difficulties, and/or a sudden severe headache.  Patient agrees and verbalizes understanding.      Patient continues to have therapy needs, out of work until therapy is finished.     Follow Up:   Return in about 3 months (around 7/29/2022).      HECTOR Portillo  [facility]

## 2022-04-29 ENCOUNTER — PATIENT MESSAGE (OUTPATIENT)
Dept: INTERNAL MEDICINE | Facility: CLINIC | Age: 47
End: 2022-04-29

## 2022-04-29 ENCOUNTER — OFFICE VISIT (OUTPATIENT)
Dept: NEUROLOGY | Facility: CLINIC | Age: 47
End: 2022-04-29

## 2022-04-29 VITALS
HEART RATE: 76 BPM | OXYGEN SATURATION: 98 % | TEMPERATURE: 98.2 F | BODY MASS INDEX: 35.76 KG/M2 | DIASTOLIC BLOOD PRESSURE: 72 MMHG | WEIGHT: 247 LBS | SYSTOLIC BLOOD PRESSURE: 128 MMHG

## 2022-04-29 DIAGNOSIS — E11.65 UNCONTROLLED TYPE 2 DIABETES MELLITUS WITH HYPERGLYCEMIA: ICD-10-CM

## 2022-04-29 DIAGNOSIS — I10 ESSENTIAL HYPERTENSION: ICD-10-CM

## 2022-04-29 DIAGNOSIS — Z86.73 HISTORY OF STROKE: Primary | ICD-10-CM

## 2022-04-29 DIAGNOSIS — N52.9 ERECTILE DYSFUNCTION, UNSPECIFIED ERECTILE DYSFUNCTION TYPE: Primary | ICD-10-CM

## 2022-04-29 DIAGNOSIS — E78.5 HYPERLIPIDEMIA LDL GOAL <70: ICD-10-CM

## 2022-04-29 PROCEDURE — 99214 OFFICE O/P EST MOD 30 MIN: CPT

## 2022-04-29 RX ORDER — SILDENAFIL 50 MG/1
50 TABLET, FILM COATED ORAL DAILY PRN
Qty: 9 TABLET | Refills: 2 | Status: SHIPPED | OUTPATIENT
Start: 2022-04-29 | End: 2022-05-02 | Stop reason: SDUPTHER

## 2022-04-29 RX ORDER — ATORVASTATIN CALCIUM 80 MG/1
80 TABLET, FILM COATED ORAL NIGHTLY
Qty: 30 TABLET | Refills: 5 | Status: SHIPPED | OUTPATIENT
Start: 2022-04-29

## 2022-04-29 RX ORDER — ASPIRIN 325 MG
325 TABLET ORAL DAILY
COMMUNITY
End: 2022-08-11

## 2022-04-29 NOTE — TELEPHONE ENCOUNTER
From: Kee Gutierrez  To: HECTOR Pena  Sent: 4/29/2022 1:40 PM EDT  Subject: ED meds    We saw Neuro today and in the notes under 'plan' the doctor approved the use of ED medication. So whenever you get a chance to call that in would be great. Thank you so much. Also, their notes stated I was back to work, but I don't go back to work until May 16th, so I'm not sure how important it is to correct that.

## 2022-05-02 ENCOUNTER — TELEPHONE (OUTPATIENT)
Dept: INTERNAL MEDICINE | Facility: CLINIC | Age: 47
End: 2022-05-02

## 2022-05-02 RX ORDER — SILDENAFIL 50 MG/1
50 TABLET, FILM COATED ORAL DAILY PRN
Qty: 9 TABLET | Refills: 2 | Status: SHIPPED | OUTPATIENT
Start: 2022-05-02 | End: 2022-05-17

## 2022-05-02 NOTE — TELEPHONE ENCOUNTER
----- Message from Kee Gutierrez sent at 5/1/2022 12:08 PM EDT -----  Regarding: ED meds  So even with using a prescription discount card, at [facility] the prescription is over $150.00, but only $4 at [facility]. Can my wife stop by Monday to  a written prescription or can it be called into the [facility] pharmacy? We use the [facility] for everything else, but [facility] to [facility]. Thank you. So sorry for asking. [facility] there.

## 2022-05-13 DIAGNOSIS — I10 ESSENTIAL HYPERTENSION: ICD-10-CM

## 2022-05-13 DIAGNOSIS — E11.65 UNCONTROLLED TYPE 2 DIABETES MELLITUS WITH HYPERGLYCEMIA: ICD-10-CM

## 2022-05-13 RX ORDER — LISINOPRIL 20 MG/1
20 TABLET ORAL
Qty: 30 TABLET | Refills: 0 | Status: SHIPPED | OUTPATIENT
Start: 2022-05-13 | End: 2022-06-13

## 2022-05-17 ENCOUNTER — OFFICE VISIT (OUTPATIENT)
Dept: INTERNAL MEDICINE | Facility: CLINIC | Age: 47
End: 2022-05-17

## 2022-05-17 VITALS
WEIGHT: 246 LBS | RESPIRATION RATE: 18 BRPM | OXYGEN SATURATION: 100 % | SYSTOLIC BLOOD PRESSURE: 120 MMHG | BODY MASS INDEX: 35.62 KG/M2 | HEART RATE: 69 BPM | DIASTOLIC BLOOD PRESSURE: 72 MMHG | TEMPERATURE: 98.1 F

## 2022-05-17 DIAGNOSIS — E11.65 UNCONTROLLED TYPE 2 DIABETES MELLITUS WITH HYPERGLYCEMIA: ICD-10-CM

## 2022-05-17 DIAGNOSIS — N52.9 ERECTILE DYSFUNCTION, UNSPECIFIED ERECTILE DYSFUNCTION TYPE: ICD-10-CM

## 2022-05-17 DIAGNOSIS — R79.89 DECREASED FREE TESTOSTERONE LEVEL IN MALE: Primary | ICD-10-CM

## 2022-05-17 PROCEDURE — 99214 OFFICE O/P EST MOD 30 MIN: CPT

## 2022-05-17 RX ORDER — SILDENAFIL 100 MG/1
100 TABLET, FILM COATED ORAL DAILY PRN
Qty: 9 TABLET | Refills: 2 | Status: SHIPPED | OUTPATIENT
Start: 2022-05-17 | End: 2022-05-19 | Stop reason: SDUPTHER

## 2022-05-17 NOTE — PROGRESS NOTES
Chief Complaint  Diabetes and Hypertension    Kee Gutierrez presents to [facility]    DM- Has been taking jardiance, sitagliptin and metformin each day with no omitted doses and no side effects reported. Has been monitoring FPG with readings in the 140-160's. States he has had some cheat meals recently but has been trying to reduce carbohydrate intake. Also having difficulty affording healthy foods with being out of work. Has been exercising 4-5 days per week. Endorses polyuria. Denies polyphagia, polydipsia, neuropathies, or poor wound healing.     ED-  Has tried sildenafil with no side effects and some improvement in symptoms. States he is able to achieve an erection but is unable to maintain.     Subjective         Health Maintenance   Topic   • COLORECTAL CANCER SCREENING    • DIABETIC EYE EXAM    • Pneumococcal Vaccine 0-64 (1 - PCV)   • Hepatitis B (1 of 3 - Risk 3-dose series)   • COVID-19 Vaccine (3 - Booster for Moderna series)   • INFLUENZA VACCINE    • HEMOGLOBIN A1C    • LIPID PANEL    • DIABETIC FOOT EXAM    • URINE MICROALBUMIN    • ANNUAL PHYSICAL    • TDAP/TD VACCINES (2 - Td or Tdap)   • HEPATITIS C SCREENING        [facility]  The following portions of the patient's history were reviewed and updated as appropriate: allergies, current medications, past family history, past medical history, past social history, past surgical history and problem list.     Past Medical History:   Diagnosis Date   • Diabetes mellitus (HCC) 3/26/2022   • Erectile dysfunction 1/1/2022   • Headache 3/26/2022   • Hyperlipidemia 3/36/2022   • Hypertension    • Stroke (HCC) 3/26/2022      No Known Allergies   Social History     Tobacco Use   • Smoking status: Never Smoker   • Smokeless tobacco: Never Used   Vaping Use   • Vaping Use: Never used   Substance Use Topics   • Alcohol use: Not Currently   • Drug use: Never     No past surgical history on file.   Family History   Problem Relation  Age of Onset   • Heart disease Father    • Cancer Mother         uterian cancer   • Hyperlipidemia Maternal Grandfather    • Thyroid disease Maternal Grandfather    • Dementia Maternal Grandfather    • Hyperlipidemia Maternal Grandmother    • Stroke Maternal Grandmother         In her 60's had some mini strokes   • Thyroid disease Maternal Grandmother    • Dementia Maternal Grandmother    • Cancer Paternal Grandfather         lung cancer   • Diabetes Paternal Grandfather    • Asthma Son         very mild   • Learning disabilities Son         Dyslexia   • Learning disabilities Son         Dyslexia         Current Outpatient Medications:   •  aspirin 325 MG tablet, Take 325 mg by mouth Daily., Disp: , Rfl:   •  atorvastatin (LIPITOR) 80 MG tablet, Take 1 tablet by mouth Every Night., Disp: 30 tablet, Rfl: 5  •  empagliflozin (Jardiance) 25 MG tablet tablet, Take 1 tablet by mouth Daily., Disp: 90 tablet, Rfl: 1  •  glucose blood test strip, Use to check blood glucose 2-4 times per day Test strips for One Touch Verio Reflect glucometer, Disp: 100 each, Rfl: 12  •  Lancets Ultra Thin 30G misc, Use to check blood glucose 2-4 times per day, Disp: 100 each, Rfl: 5  •  lisinopril (PRINIVIL,ZESTRIL) 20 MG tablet, Take 1 tablet by mouth Daily., Disp: 30 tablet, Rfl: 0  •  metFORMIN (GLUCOPHAGE) 1000 MG tablet, Take 1 tablet by mouth 2 (Two) Times a Day With Meals., Disp: 60 tablet, Rfl: 1  •  sildenafil (Viagra) 100 MG tablet, Take 1 tablet by mouth Daily As Needed for Erectile Dysfunction., Disp: 9 tablet, Rfl: 2  •  SITagliptin (JANUVIA) 100 MG tablet, Take 1 tablet by mouth Daily., Disp: 90 tablet, Rfl: 1    Review of Systems   Constitutional: Negative for chills, diaphoresis, fatigue, unexpected weight gain and unexpected weight loss.   Eyes: Negative for blurred vision, double vision, photophobia and visual disturbance.   Respiratory: Negative for apnea, cough, choking, chest tightness, shortness of breath, wheezing and  stridor.    Cardiovascular: Negative for chest pain, palpitations and leg swelling.   Endocrine: Positive for polyuria. Negative for polydipsia and polyphagia.   Skin: Negative for color change, dry skin, pallor, rash, skin lesions, wound and bruise.   Neurological: Negative for dizziness, tremors, seizures, speech difficulty, weakness and confusion.   Psychiatric/Behavioral: The patient is not nervous/anxious.        Objective   Vital Signs  /72   Pulse 69   Temp 98.1 °F (36.7 °C)   Resp 18   Wt 112 kg (246 lb)   SpO2 100%   BMI 35.62 kg/m²     Physical Exam  Constitutional:       Appearance: Normal appearance.   HENT:      Head: Normocephalic.      Mouth/Throat:      Mouth: Mucous membranes are moist.      Pharynx: Oropharynx is clear.   Eyes:      Extraocular Movements: Extraocular movements intact.      Conjunctiva/sclera: Conjunctivae normal.      Pupils: Pupils are equal, round, and reactive to light.   Cardiovascular:      Rate and Rhythm: Normal rate and regular rhythm.      Pulses: Normal pulses.      Heart sounds: Normal heart sounds.   Pulmonary:      Effort: Pulmonary effort is normal.      Breath sounds: Normal breath sounds.   Abdominal:      General: Bowel sounds are normal.      Palpations: Abdomen is soft.   Skin:     General: Skin is warm and dry.      Capillary Refill: Capillary refill takes less than 2 seconds.   Neurological:      Mental Status: He is alert and oriented to person, place, and time.   Psychiatric:         Mood and Affect: Mood normal.         Behavior: Behavior normal.         Thought Content: Thought content normal.         Judgment: Judgment normal.          Result Review :     The following data was reviewed by: HECTOR Pena on 05/17/2022:  CMP    CMP 3/26/22 3/26/22 3/26/22 3/26/22 3/27/22 4/19/22    0811 0811 0811 0817     Glucose   295 (A)  205 (A) 205 (A)   BUN   19  14 17   Creatinine   1.01 0.90 1.02 1.17   Sodium   134 (A)  136 137   Potassium   3.9  3.9  4.5   Chloride   98  100 100   Calcium   9.8  9.3 9.9   Albumin   4.70   4.90   Total Bilirubin   0.6   0.5   Alkaline Phosphatase   74   81   AST (SGOT)  20 19   22   ALT (SGPT) 28  30   38   (A) Abnormal value       Comments are available for some flowsheets but are not being displayed.           Most Recent A1C    HGBA1C Most Recent 3/26/22   Hemoglobin A1C 9.40 (A)   (A) Abnormal value              Assessment and Plan    1. Uncontrolled type 2 diabetes mellitus with hyperglycemia (HCC)  - empagliflozin (Jardiance) 25 MG tablet tablet; Take 1 tablet by mouth Daily.  Dispense: 90 tablet; Refill: 1  - Comprehensive Metabolic Panel; Future  - Uncontrolled based on home readings. Has been on 10 mg of jardiance for 4 weeks Will increase jardiance to 25 mg. Continue with metformin and sitagliptin as prescribed. Continue to monitor blood glucose at home obtaining a combination of both fasting blood glucoses (upon awakening/before eating) and post-prandial blood glucoses (2 hours after beginning of meals). FBGs should be between  and post-prandial blood glucoses should be less than 180 to ensure that A1C is less than 7%. Consume a diet low in saturated fat (13 g max), refined carbohydrates, and fructose corn syrup. Consume a diet high in fiber (30g/day), lean protein, and fresh produce intake. Increase physical activity to include 150 mins per week (30 mins per day 5 days per week) of moderate intensity aerobic activity and resistance training 2-3 x per week. Attempt to lose weight to achieve an ideal BMI of 18.5-24.9. Perform foot inspection and foot care daily.    Goals for you:   • A1C less than 7%  • FPG should be   • PP should be less than 180  • BP should be less than 130/80  • LDL less than 100  • Albumin/creatinine ratio less than 30 mg/g  • Abstain from smoking and alcohol use  • Annual dilated eye exam  • Annual diabetic foot exam     Education performed during this visit includes long term diabetic  complications and routine health maintenance including annual eye exams with ophthalmology, appropriate dental hygiene, and foot care. Medications, including side effects and compliance discussed carefully and Hypoglycemia management and prevention reviewed.   Discussed the nature of the disease including, risks, complications, implications, management, safe and proper use of medications. Encouraged therapeutic lifestyle changes including low calorie diet with plenty of fruits and vegetables, daily exercise, medication compliance, and keeping scheduled follow up appointments as indicated. Encouraged patient to have appointment for complete physical, fasting labs, appropriate screenings, and immunizations on an annual basis.    2. Erectile dysfunction, unspecified erectile dysfunction type  - Testosterone (Free & Total), LC / MS; Future  - Lipid Panel; Future  - Sildenafil (viagra) 100 mg tablet  - Improved but uncontrolled. Will check test and increase sildenafil to 100 mg. Encouraged to follow-up in office if no improvement in symptoms on this dose.     Follow Up     Return in about 1 month (around 6/17/2022) for DM and A1c .    Patient was given instructions and counseling regarding his condition or for health maintenance advice. Please see specific information pulled into the AVS if appropriate.    Part of this note may be an electronic transcription/translation of spoken language to printed text using the Dragon Dictation System.    Electronically signed by:   HECTOR Pena  05/17/2022

## 2022-05-19 ENCOUNTER — LAB (OUTPATIENT)
Dept: LAB | Facility: HOSPITAL | Age: 47
End: 2022-05-19

## 2022-05-19 DIAGNOSIS — N52.9 ERECTILE DYSFUNCTION, UNSPECIFIED ERECTILE DYSFUNCTION TYPE: ICD-10-CM

## 2022-05-19 DIAGNOSIS — E11.65 UNCONTROLLED TYPE 2 DIABETES MELLITUS WITH HYPERGLYCEMIA: ICD-10-CM

## 2022-05-19 DIAGNOSIS — E11.65 TYPE 2 DIABETES MELLITUS WITH HYPERGLYCEMIA, WITHOUT LONG-TERM CURRENT USE OF INSULIN: ICD-10-CM

## 2022-05-19 DIAGNOSIS — Z00.00 ANNUAL PHYSICAL EXAM: ICD-10-CM

## 2022-05-19 DIAGNOSIS — Z12.5 SCREENING PSA (PROSTATE SPECIFIC ANTIGEN): ICD-10-CM

## 2022-05-19 LAB
ALBUMIN SERPL-MCNC: 4.8 G/DL (ref 3.5–5.2)
ALBUMIN/GLOB SERPL: 2.1 G/DL
ALP SERPL-CCNC: 78 U/L (ref 39–117)
ALT SERPL W P-5'-P-CCNC: 35 U/L (ref 1–41)
ANION GAP SERPL CALCULATED.3IONS-SCNC: 13.6 MMOL/L (ref 5–15)
AST SERPL-CCNC: 23 U/L (ref 1–40)
BILIRUB SERPL-MCNC: 0.6 MG/DL (ref 0–1.2)
BUN SERPL-MCNC: 19 MG/DL (ref 6–20)
BUN/CREAT SERPL: 20.2 (ref 7–25)
CALCIUM SPEC-SCNC: 9.4 MG/DL (ref 8.6–10.5)
CHLORIDE SERPL-SCNC: 102 MMOL/L (ref 98–107)
CHOLEST SERPL-MCNC: 114 MG/DL (ref 0–200)
CO2 SERPL-SCNC: 20.4 MMOL/L (ref 22–29)
CREAT SERPL-MCNC: 0.94 MG/DL (ref 0.76–1.27)
EGFRCR SERPLBLD CKD-EPI 2021: 100.6 ML/MIN/1.73
GLOBULIN UR ELPH-MCNC: 2.3 GM/DL
GLUCOSE SERPL-MCNC: 106 MG/DL (ref 65–99)
HDLC SERPL-MCNC: 32 MG/DL (ref 40–60)
LDLC SERPL CALC-MCNC: 66 MG/DL (ref 0–100)
LDLC/HDLC SERPL: 2.06 {RATIO}
POTASSIUM SERPL-SCNC: 4.3 MMOL/L (ref 3.5–5.2)
PROT SERPL-MCNC: 7.1 G/DL (ref 6–8.5)
PSA SERPL-MCNC: 0.52 NG/ML (ref 0–4)
SODIUM SERPL-SCNC: 136 MMOL/L (ref 136–145)
TRIGL SERPL-MCNC: 80 MG/DL (ref 0–150)
VLDLC SERPL-MCNC: 16 MG/DL (ref 5–40)

## 2022-05-19 PROCEDURE — 36415 COLL VENOUS BLD VENIPUNCTURE: CPT

## 2022-05-19 PROCEDURE — 80061 LIPID PANEL: CPT

## 2022-05-19 PROCEDURE — 84403 ASSAY OF TOTAL TESTOSTERONE: CPT

## 2022-05-19 PROCEDURE — 80053 COMPREHEN METABOLIC PANEL: CPT

## 2022-05-19 PROCEDURE — 84402 ASSAY OF FREE TESTOSTERONE: CPT

## 2022-05-19 PROCEDURE — G0103 PSA SCREENING: HCPCS

## 2022-05-19 RX ORDER — SILDENAFIL 100 MG/1
100 TABLET, FILM COATED ORAL DAILY PRN
Qty: 9 TABLET | Refills: 2 | Status: SHIPPED | OUTPATIENT
Start: 2022-05-19 | End: 2022-05-19 | Stop reason: SDUPTHER

## 2022-05-19 RX ORDER — SILDENAFIL 100 MG/1
100 TABLET, FILM COATED ORAL DAILY PRN
Qty: 9 TABLET | Refills: 2 | Status: SHIPPED | OUTPATIENT
Start: 2022-05-19 | End: 2022-08-11

## 2022-05-26 LAB
TESTOST FREE SERPL-MCNC: 4.3 PG/ML (ref 6.8–21.5)
TESTOST SERPL-MCNC: 288.7 NG/DL (ref 264–916)

## 2022-06-03 DIAGNOSIS — R79.89 LOW TESTOSTERONE IN MALE: Primary | ICD-10-CM

## 2022-06-09 ENCOUNTER — LAB (OUTPATIENT)
Dept: LAB | Facility: HOSPITAL | Age: 47
End: 2022-06-09

## 2022-06-09 DIAGNOSIS — R79.89 DECREASED FREE TESTOSTERONE LEVEL IN MALE: ICD-10-CM

## 2022-06-09 LAB
FSH SERPL-ACNC: 5.4 MIU/ML
LH SERPL-ACNC: 2.17 MIU/ML

## 2022-06-09 PROCEDURE — 84402 ASSAY OF FREE TESTOSTERONE: CPT

## 2022-06-09 PROCEDURE — 83001 ASSAY OF GONADOTROPIN (FSH): CPT

## 2022-06-09 PROCEDURE — 36415 COLL VENOUS BLD VENIPUNCTURE: CPT

## 2022-06-09 PROCEDURE — 83002 ASSAY OF GONADOTROPIN (LH): CPT

## 2022-06-09 PROCEDURE — 84403 ASSAY OF TOTAL TESTOSTERONE: CPT

## 2022-06-11 DIAGNOSIS — I10 ESSENTIAL HYPERTENSION: ICD-10-CM

## 2022-06-11 DIAGNOSIS — E11.65 UNCONTROLLED TYPE 2 DIABETES MELLITUS WITH HYPERGLYCEMIA: ICD-10-CM

## 2022-06-13 RX ORDER — EMPAGLIFLOZIN 10 MG/1
TABLET, FILM COATED ORAL
Qty: 30 TABLET | Refills: 0 | OUTPATIENT
Start: 2022-06-13

## 2022-06-13 RX ORDER — LISINOPRIL 20 MG/1
20 TABLET ORAL
Qty: 30 TABLET | Refills: 0 | Status: SHIPPED | OUTPATIENT
Start: 2022-06-13 | End: 2022-07-22 | Stop reason: SDUPTHER

## 2022-06-16 LAB
TESTOST FREE SERPL-MCNC: 4.5 PG/ML (ref 6.8–21.5)
TESTOST SERPL-MCNC: 174.3 NG/DL (ref 264–916)

## 2022-06-28 ENCOUNTER — OFFICE VISIT (OUTPATIENT)
Dept: INTERNAL MEDICINE | Facility: CLINIC | Age: 47
End: 2022-06-28

## 2022-06-28 VITALS
WEIGHT: 234 LBS | DIASTOLIC BLOOD PRESSURE: 78 MMHG | HEART RATE: 73 BPM | SYSTOLIC BLOOD PRESSURE: 128 MMHG | BODY MASS INDEX: 34.66 KG/M2 | TEMPERATURE: 97.5 F | OXYGEN SATURATION: 96 % | HEIGHT: 69 IN

## 2022-06-28 DIAGNOSIS — E11.9 CONTROLLED TYPE 2 DIABETES MELLITUS WITHOUT COMPLICATION, WITHOUT LONG-TERM CURRENT USE OF INSULIN: ICD-10-CM

## 2022-06-28 DIAGNOSIS — I10 ESSENTIAL HYPERTENSION: Primary | ICD-10-CM

## 2022-06-28 LAB
EXPIRATION DATE: NORMAL
HBA1C MFR BLD: 5.8 %
Lab: NORMAL

## 2022-06-28 PROCEDURE — 83036 HEMOGLOBIN GLYCOSYLATED A1C: CPT | Performed by: INTERNAL MEDICINE

## 2022-06-28 PROCEDURE — 99214 OFFICE O/P EST MOD 30 MIN: CPT | Performed by: INTERNAL MEDICINE

## 2022-06-28 NOTE — PROGRESS NOTES
Subjective   Kee Gutierrez is a 46 y.o. male.   Chief Complaint   Patient presents with   • Diabetes   • Hypertension       History of Present Illness   Here to establish care. Previously seen by NP in our practice. Chronic conditions: HTN and DM. DM is now controlled with Metformin, Jardiance, and Januvia. HTN controlled with Lisinopril.  Had stroke in 3/22 and on ASA and Lipitor.   The following portions of the patient's history were reviewed and updated as appropriate: allergies, current medications, past family history, past medical history, past social history, past surgical history and problem list.  Past Medical History:   Diagnosis Date   • Diabetes mellitus (HCC) 3/26/2022   • Erectile dysfunction 1/1/2022   • Headache 3/26/2022   • Hyperlipidemia 3/36/2022   • Hypertension    • Stroke (HCC) 3/26/2022     History reviewed. No pertinent surgical history.  Family History   Problem Relation Age of Onset   • Heart disease Father    • Cancer Mother         uterian cancer   • Hyperlipidemia Maternal Grandfather    • Thyroid disease Maternal Grandfather    • Dementia Maternal Grandfather    • Hyperlipidemia Maternal Grandmother    • Stroke Maternal Grandmother         In her 60's had some mini strokes   • Thyroid disease Maternal Grandmother    • Dementia Maternal Grandmother    • Cancer Paternal Grandfather         lung cancer   • Diabetes Paternal Grandfather    • Asthma Son         very mild   • Learning disabilities Son         Dyslexia   • Learning disabilities Son         Dyslexia     Social History     Socioeconomic History   • Marital status:    Tobacco Use   • Smoking status: Never Smoker   • Smokeless tobacco: Never Used   Vaping Use   • Vaping Use: Never used   Substance and Sexual Activity   • Alcohol use: Not Currently   • Drug use: Never   • Sexual activity: Yes     Partners: Female     Birth control/protection: None     Comment:  - wife has already gone through menopause  "    Current Outpatient Medications on File Prior to Visit   Medication Sig Dispense Refill   • aspirin 325 MG tablet Take 325 mg by mouth Daily.     • atorvastatin (LIPITOR) 80 MG tablet Take 1 tablet by mouth Every Night. 30 tablet 5   • empagliflozin (Jardiance) 25 MG tablet tablet Take 1 tablet by mouth Daily. 90 tablet 1   • glucose blood test strip Use to check blood glucose 2-4 times per day  Test strips for One Touch Verio Reflect glucometer 100 each 12   • Lancets Ultra Thin 30G misc Use to check blood glucose 2-4 times per day 100 each 5   • lisinopril (PRINIVIL,ZESTRIL) 20 MG tablet TAKE 1 TABLET BY MOUTH DAILY 30 tablet 0   • metFORMIN (GLUCOPHAGE) 1000 MG tablet Take 1 tablet by mouth 2 (Two) Times a Day With Meals. 60 tablet 1   • sildenafil (Viagra) 100 MG tablet Take 1 tablet by mouth Daily As Needed for Erectile Dysfunction. 9 tablet 2   • SITagliptin (JANUVIA) 100 MG tablet Take 1 tablet by mouth Daily. 90 tablet 1     No current facility-administered medications on file prior to visit.       Review of Systems   Constitutional: Negative for diaphoresis and fatigue.   Respiratory: Negative for cough and shortness of breath.    Cardiovascular: Negative for chest pain and leg swelling.   Gastrointestinal: Negative for diarrhea, nausea and vomiting.   Neurological: Negative for weakness.   Psychiatric/Behavioral: Negative for confusion. The patient is not nervous/anxious.    /78   Pulse 73   Temp 97.5 °F (36.4 °C)   Ht 175.3 cm (69\")   Wt 106 kg (234 lb)   SpO2 96%   BMI 34.56 kg/m²       Objective   Physical Exam  Vitals reviewed.   Cardiovascular:      Rate and Rhythm: Normal rate and regular rhythm.      Heart sounds: No murmur heard.  Pulmonary:      Effort: No respiratory distress.      Breath sounds: No wheezing or rales.   Neurological:      General: No focal deficit present.      Mental Status: He is alert and oriented to person, place, and time.   Psychiatric:         Mood and " Affect: Mood normal.         Behavior: Behavior normal.         Assessment & Plan   Diagnoses and all orders for this visit:    1. Essential hypertension (Primary)  Continue Lisinopril 20 mg po qhs  2. Controlled type 2 diabetes mellitus without complication, without long-term current use of insulin ([facility])  -     POC Glycosylated Hemoglobin (Hb A1C)  A1c 5.8. Continue Metformin 1000 mg po bid, Jardiance 25 mg po qd, and Januvia 100 mg po qd and Lipitor 80 mg po qhs

## 2022-07-05 RX ORDER — SODIUM, POTASSIUM,MAG SULFATES 17.5-3.13G
2 SOLUTION, RECONSTITUTED, ORAL ORAL TAKE AS DIRECTED
Qty: 354 ML | Refills: 0 | Status: SHIPPED | OUTPATIENT
Start: 2022-07-05 | End: 2022-08-11

## 2022-07-19 ENCOUNTER — OUTSIDE FACILITY SERVICE (OUTPATIENT)
Dept: GASTROENTEROLOGY | Facility: CLINIC | Age: 47
End: 2022-07-19

## 2022-07-19 PROCEDURE — 45385 COLONOSCOPY W/LESION REMOVAL: CPT | Performed by: INTERNAL MEDICINE

## 2022-07-19 PROCEDURE — 88305 TISSUE EXAM BY PATHOLOGIST: CPT | Performed by: INTERNAL MEDICINE

## 2022-07-20 ENCOUNTER — LAB REQUISITION (OUTPATIENT)
Dept: LAB | Facility: HOSPITAL | Age: 47
End: 2022-07-20

## 2022-07-20 DIAGNOSIS — D12.2 BENIGN NEOPLASM OF ASCENDING COLON: ICD-10-CM

## 2022-07-20 DIAGNOSIS — K57.30 DIVERTICULOSIS OF LARGE INTESTINE WITHOUT PERFORATION OR ABSCESS WITHOUT BLEEDING: ICD-10-CM

## 2022-07-20 DIAGNOSIS — Z12.11 ENCOUNTER FOR SCREENING FOR MALIGNANT NEOPLASM OF COLON: ICD-10-CM

## 2022-07-21 LAB
CYTO UR: NORMAL
LAB AP CASE REPORT: NORMAL
LAB AP CLINICAL INFORMATION: NORMAL
LAB AP DIAGNOSIS COMMENT: NORMAL
PATH REPORT.FINAL DX SPEC: NORMAL
PATH REPORT.GROSS SPEC: NORMAL

## 2022-07-22 DIAGNOSIS — I10 ESSENTIAL HYPERTENSION: ICD-10-CM

## 2022-07-22 RX ORDER — LISINOPRIL 20 MG/1
20 TABLET ORAL
Qty: 30 TABLET | Refills: 3 | Status: SHIPPED | OUTPATIENT
Start: 2022-07-22 | End: 2022-11-07 | Stop reason: SDUPTHER

## 2022-08-11 ENCOUNTER — OFFICE VISIT (OUTPATIENT)
Dept: NEUROLOGY | Facility: CLINIC | Age: 47
End: 2022-08-11

## 2022-08-11 VITALS
SYSTOLIC BLOOD PRESSURE: 118 MMHG | OXYGEN SATURATION: 98 % | BODY MASS INDEX: 34.26 KG/M2 | HEART RATE: 75 BPM | WEIGHT: 232 LBS | DIASTOLIC BLOOD PRESSURE: 70 MMHG | TEMPERATURE: 98.4 F

## 2022-08-11 DIAGNOSIS — E78.5 HYPERLIPIDEMIA LDL GOAL <70: ICD-10-CM

## 2022-08-11 DIAGNOSIS — E11.9 CONTROLLED TYPE 2 DIABETES MELLITUS WITHOUT COMPLICATION, WITHOUT LONG-TERM CURRENT USE OF INSULIN: ICD-10-CM

## 2022-08-11 DIAGNOSIS — Z86.73 HISTORY OF STROKE: Primary | ICD-10-CM

## 2022-08-11 DIAGNOSIS — I10 ESSENTIAL HYPERTENSION: ICD-10-CM

## 2022-08-11 PROCEDURE — 99214 OFFICE O/P EST MOD 30 MIN: CPT | Performed by: NURSE PRACTITIONER

## 2022-08-11 RX ORDER — ASPIRIN 81 MG/1
81 TABLET, CHEWABLE ORAL DAILY
COMMUNITY

## 2022-10-03 DIAGNOSIS — E11.65 TYPE 2 DIABETES MELLITUS WITH HYPERGLYCEMIA, WITHOUT LONG-TERM CURRENT USE OF INSULIN: ICD-10-CM

## 2022-10-27 ENCOUNTER — LAB (OUTPATIENT)
Dept: LAB | Facility: HOSPITAL | Age: 47
End: 2022-10-27

## 2022-10-27 ENCOUNTER — OFFICE VISIT (OUTPATIENT)
Dept: INTERNAL MEDICINE | Facility: CLINIC | Age: 47
End: 2022-10-27

## 2022-10-27 VITALS
BODY MASS INDEX: 35.25 KG/M2 | SYSTOLIC BLOOD PRESSURE: 124 MMHG | DIASTOLIC BLOOD PRESSURE: 82 MMHG | OXYGEN SATURATION: 99 % | HEIGHT: 69 IN | TEMPERATURE: 96.7 F | WEIGHT: 238 LBS | HEART RATE: 76 BPM

## 2022-10-27 DIAGNOSIS — R79.89 LOW TESTOSTERONE IN MALE: ICD-10-CM

## 2022-10-27 DIAGNOSIS — I10 ESSENTIAL HYPERTENSION: Primary | ICD-10-CM

## 2022-10-27 DIAGNOSIS — R79.89 LOW TESTOSTERONE IN MALE: Primary | ICD-10-CM

## 2022-10-27 DIAGNOSIS — E11.9 CONTROLLED TYPE 2 DIABETES MELLITUS WITHOUT COMPLICATION, WITHOUT LONG-TERM CURRENT USE OF INSULIN: ICD-10-CM

## 2022-10-27 DIAGNOSIS — I10 ESSENTIAL HYPERTENSION: ICD-10-CM

## 2022-10-27 LAB
ALBUMIN SERPL-MCNC: 4.8 G/DL (ref 3.5–5.2)
ALBUMIN/GLOB SERPL: 1.8 G/DL
ALP SERPL-CCNC: 73 U/L (ref 39–117)
ALT SERPL W P-5'-P-CCNC: 17 U/L (ref 1–41)
ANION GAP SERPL CALCULATED.3IONS-SCNC: 12 MMOL/L (ref 5–15)
AST SERPL-CCNC: 20 U/L (ref 1–40)
BILIRUB SERPL-MCNC: 0.5 MG/DL (ref 0–1.2)
BUN SERPL-MCNC: 19 MG/DL (ref 6–20)
BUN/CREAT SERPL: 23.2 (ref 7–25)
CALCIUM SPEC-SCNC: 9.7 MG/DL (ref 8.6–10.5)
CHLORIDE SERPL-SCNC: 103 MMOL/L (ref 98–107)
CHOLEST SERPL-MCNC: 145 MG/DL (ref 0–200)
CO2 SERPL-SCNC: 25 MMOL/L (ref 22–29)
CREAT SERPL-MCNC: 0.82 MG/DL (ref 0.76–1.27)
EGFRCR SERPLBLD CKD-EPI 2021: 108.4 ML/MIN/1.73
EXPIRATION DATE: NORMAL
GLOBULIN UR ELPH-MCNC: 2.7 GM/DL
GLUCOSE SERPL-MCNC: 137 MG/DL (ref 65–99)
HBA1C MFR BLD: 6 %
HDLC SERPL-MCNC: 40 MG/DL (ref 40–60)
LDLC SERPL CALC-MCNC: 88 MG/DL (ref 0–100)
LDLC/HDLC SERPL: 2.17 {RATIO}
Lab: NORMAL
POTASSIUM SERPL-SCNC: 4.4 MMOL/L (ref 3.5–5.2)
PROT SERPL-MCNC: 7.5 G/DL (ref 6–8.5)
SODIUM SERPL-SCNC: 140 MMOL/L (ref 136–145)
TRIGL SERPL-MCNC: 92 MG/DL (ref 0–150)
VLDLC SERPL-MCNC: 17 MG/DL (ref 5–40)

## 2022-10-27 PROCEDURE — 99214 OFFICE O/P EST MOD 30 MIN: CPT | Performed by: INTERNAL MEDICINE

## 2022-10-27 PROCEDURE — 80053 COMPREHEN METABOLIC PANEL: CPT

## 2022-10-27 PROCEDURE — 83036 HEMOGLOBIN GLYCOSYLATED A1C: CPT | Performed by: INTERNAL MEDICINE

## 2022-10-27 PROCEDURE — 84403 ASSAY OF TOTAL TESTOSTERONE: CPT

## 2022-10-27 PROCEDURE — 84402 ASSAY OF FREE TESTOSTERONE: CPT

## 2022-10-27 PROCEDURE — 80061 LIPID PANEL: CPT

## 2022-10-27 NOTE — PROGRESS NOTES
Subjective   Kee Gutierrez is a 47 y.o. male.   Chief Complaint   Patient presents with   • Hypertension   • Diabetes   • Stress       History of Present Illness   Here for f/u on chronic conditions: HTN and DM. HTN controlled with Lisiniopril. DM controlled with metformin, Jardiance, Januvia, and on statin. No CP or SOA.   Low testosterone in the past.   The following portions of the patient's history were reviewed and updated as appropriate: allergies, current medications, past family history, past medical history, past social history, past surgical history and problem list.  Past Medical History:   Diagnosis Date   • Diabetes mellitus (HCC) 3/26/2022   • Erectile dysfunction 1/1/2022   • Headache 3/26/2022   • Hyperlipidemia 3/36/2022   • Hypertension    • Stroke (HCC) 3/26/2022     History reviewed. No pertinent surgical history.  Family History   Problem Relation Age of Onset   • Heart disease Father    • Cancer Mother         uterian cancer   • Hyperlipidemia Maternal Grandfather    • Thyroid disease Maternal Grandfather    • Dementia Maternal Grandfather    • Hyperlipidemia Maternal Grandmother    • Stroke Maternal Grandmother         In her 60's had some mini strokes   • Thyroid disease Maternal Grandmother    • Dementia Maternal Grandmother    • Cancer Paternal Grandfather         lung cancer   • Diabetes Paternal Grandfather    • Asthma Son         very mild   • Learning disabilities Son         Dyslexia   • Learning disabilities Son         Dyslexia     Social History     Socioeconomic History   • Marital status:    Tobacco Use   • Smoking status: Never   • Smokeless tobacco: Never   Vaping Use   • Vaping Use: Never used   Substance and Sexual Activity   • Alcohol use: Not Currently   • Drug use: Never   • Sexual activity: Yes     Partners: Female     Birth control/protection: None     Comment:  - wife has already gone through menopause       Review of Systems   Constitutional:  "Negative for fatigue and fever.   Respiratory: Negative for cough and shortness of breath.    Cardiovascular: Negative for chest pain and leg swelling.   Gastrointestinal: Negative for diarrhea, nausea and vomiting.   Psychiatric/Behavioral: Negative for confusion. The patient is not nervous/anxious.      /82   Pulse 76   Temp 96.7 °F (35.9 °C)   Ht 175.3 cm (69.02\")   Wt 108 kg (238 lb)   SpO2 99%   BMI 35.13 kg/m²       Objective   Physical Exam  Vitals reviewed.   Cardiovascular:      Rate and Rhythm: Normal rate and regular rhythm.      Heart sounds: No murmur heard.  Pulmonary:      Effort: No respiratory distress.      Breath sounds: No wheezing.   Neurological:      Mental Status: He is alert and oriented to person, place, and time.   Psychiatric:         Mood and Affect: Mood normal.         Behavior: Behavior normal.         Assessment & Plan   Diagnoses and all orders for this visit:    1. Essential hypertension (Primary)  -     Lipid Panel; Future  -     Comprehensive Metabolic Panel; Future   Continue Lisinopril 20 mg po qd.  2. Controlled type 2 diabetes mellitus without complication, without long-term current use of insulin ([facility])  -     POC Glycosylated Hemoglobin (Hb A1C)  A1c 6.0 continue Metfromin 1,000 mg po bid, Januvia 100 mg po qd, Jardiance 25 mg po qd, Continue Lipitor 80 mg po qhs             "

## 2022-11-02 ENCOUNTER — TELEPHONE (OUTPATIENT)
Dept: INTERNAL MEDICINE | Facility: CLINIC | Age: 47
End: 2022-11-02

## 2022-11-02 LAB
TESTOST FREE SERPL-MCNC: 5.2 PG/ML (ref 6.8–21.5)
TESTOST SERPL-MCNC: 265 NG/DL (ref 264–916)

## 2022-11-02 NOTE — TELEPHONE ENCOUNTER
----- Message from Deidra Hurtado DO sent at 11/2/2022  7:45 AM EDT -----  Free testosterone normal (low end) and free normal.  Can send to endo for further eval if he wants.

## 2022-11-04 NOTE — TELEPHONE ENCOUNTER
3rd attempt at contact patient. Left message for patient to return phone call regarding results. Office number given.

## 2022-11-07 DIAGNOSIS — I10 ESSENTIAL HYPERTENSION: ICD-10-CM

## 2022-11-07 DIAGNOSIS — E11.65 TYPE 2 DIABETES MELLITUS WITH HYPERGLYCEMIA, WITHOUT LONG-TERM CURRENT USE OF INSULIN: ICD-10-CM

## 2022-11-07 DIAGNOSIS — E11.65 UNCONTROLLED TYPE 2 DIABETES MELLITUS WITH HYPERGLYCEMIA: ICD-10-CM

## 2022-11-07 RX ORDER — LISINOPRIL 20 MG/1
20 TABLET ORAL
Qty: 30 TABLET | Refills: 3 | Status: SHIPPED | OUTPATIENT
Start: 2022-11-07

## 2022-11-14 ENCOUNTER — TELEPHONE (OUTPATIENT)
Dept: INTERNAL MEDICINE | Facility: CLINIC | Age: 47
End: 2022-11-14

## 2022-11-14 DIAGNOSIS — R79.89 LOW TESTOSTERONE: Primary | ICD-10-CM

## 2022-11-14 NOTE — TELEPHONE ENCOUNTER
"  Caller: eKe Gutierrez    Relationship: Self    Best call back number:[phone]  What is the best time to reach you: ANY     Who are you requesting to speak with (clinical staff, provider,  specific staff member): CLINICAL       What was the call regarding: RETURNING CALL ABOUT TEST RESULTS, HUB READ \"Free testosterone normal (low end) and free normal.  Can send to endo for further eval if he wants.\"     PATIENT WOULD LIKE TO BE SENT TO ENDO FOR FURTHER EVAL. PLEASE CALL WITH INFORMATION ON THIS     Do you require a callback: PLEASE CALL- OK TO LEAVE DETAILED MESSAGE IF NO ANSWER        "

## 2022-11-14 NOTE — TELEPHONE ENCOUNTER
"HUB TO READ     \"Free testosterone normal (low end) and free normal.  Can send to endo for further eval if he wants.\"    Left message for patient to return phone call regarding results. Office number given.    "

## 2023-01-09 DIAGNOSIS — R79.89 LOW TESTOSTERONE LEVEL IN MALE: Primary | ICD-10-CM

## 2023-02-09 DIAGNOSIS — E11.65 TYPE 2 DIABETES MELLITUS WITH HYPERGLYCEMIA, WITHOUT LONG-TERM CURRENT USE OF INSULIN: ICD-10-CM

## 2024-03-05 ENCOUNTER — LAB (OUTPATIENT)
Dept: LAB | Facility: HOSPITAL | Age: 49
End: 2024-03-05
Payer: COMMERCIAL

## 2024-03-05 ENCOUNTER — OFFICE VISIT (OUTPATIENT)
Dept: INTERNAL MEDICINE | Facility: CLINIC | Age: 49
End: 2024-03-05
Payer: COMMERCIAL

## 2024-03-05 VITALS
DIASTOLIC BLOOD PRESSURE: 80 MMHG | OXYGEN SATURATION: 99 % | HEART RATE: 69 BPM | WEIGHT: 253.2 LBS | TEMPERATURE: 98.5 F | SYSTOLIC BLOOD PRESSURE: 142 MMHG | HEIGHT: 69 IN | BODY MASS INDEX: 37.5 KG/M2

## 2024-03-05 DIAGNOSIS — Z12.5 PROSTATE CANCER SCREENING: ICD-10-CM

## 2024-03-05 DIAGNOSIS — E11.9 CONTROLLED TYPE 2 DIABETES MELLITUS WITHOUT COMPLICATION, WITHOUT LONG-TERM CURRENT USE OF INSULIN: ICD-10-CM

## 2024-03-05 DIAGNOSIS — J02.0 STREP PHARYNGITIS: ICD-10-CM

## 2024-03-05 DIAGNOSIS — I10 ESSENTIAL HYPERTENSION: ICD-10-CM

## 2024-03-05 DIAGNOSIS — J02.9 SORE THROAT: Primary | ICD-10-CM

## 2024-03-05 DIAGNOSIS — E66.9 OBESITY (BMI 30-39.9): ICD-10-CM

## 2024-03-05 LAB
EXPIRATION DATE: ABNORMAL
EXPIRATION DATE: NORMAL
FLUAV AG UPPER RESP QL IA.RAPID: NOT DETECTED
FLUBV AG UPPER RESP QL IA.RAPID: NOT DETECTED
HBA1C MFR BLD: 8.3 % (ref 4.8–5.6)
INTERNAL CONTROL: ABNORMAL
INTERNAL CONTROL: NORMAL
Lab: ABNORMAL
Lab: NORMAL
S PYO AG THROAT QL: POSITIVE
SARS-COV-2 AG UPPER RESP QL IA.RAPID: NOT DETECTED

## 2024-03-05 PROCEDURE — 83036 HEMOGLOBIN GLYCOSYLATED A1C: CPT

## 2024-03-05 PROCEDURE — G0103 PSA SCREENING: HCPCS

## 2024-03-05 PROCEDURE — 80061 LIPID PANEL: CPT

## 2024-03-05 PROCEDURE — 80053 COMPREHEN METABOLIC PANEL: CPT

## 2024-03-05 PROCEDURE — 82043 UR ALBUMIN QUANTITATIVE: CPT | Performed by: INTERNAL MEDICINE

## 2024-03-05 PROCEDURE — 82570 ASSAY OF URINE CREATININE: CPT | Performed by: INTERNAL MEDICINE

## 2024-03-05 RX ORDER — SYRINGE WITH NEEDLE, 1 ML 25GX5/8"
SYRINGE, EMPTY DISPOSABLE MISCELLANEOUS
COMMUNITY
Start: 2024-02-02

## 2024-03-05 RX ORDER — TESTOSTERONE CYPIONATE 200 MG/ML
INJECTION, SOLUTION INTRAMUSCULAR
COMMUNITY
Start: 2024-02-02

## 2024-03-05 RX ORDER — TADALAFIL 10 MG/1
TABLET ORAL
COMMUNITY
Start: 2024-02-02

## 2024-03-05 RX ORDER — AMOXICILLIN AND CLAVULANATE POTASSIUM 875; 125 MG/1; MG/1
1 TABLET, FILM COATED ORAL 2 TIMES DAILY
Qty: 20 TABLET | Refills: 0 | Status: SHIPPED | OUTPATIENT
Start: 2024-03-05

## 2024-03-05 NOTE — PROGRESS NOTES
"Subjective   Kee Gutierrez is a 48 y.o. male.   Chief Complaint   Patient presents with    Sore Throat       History of Present Illness   Sore throat x 2 weeks. No getting better.No rash. No fever. No cough. No SOA.   HTN controlled with Lisinopril. DM controlled with Metformin, Januvia, and Jardiance.  Has not been compliant with f/u ( last visit 10/22)  The following portions of the patient's history were reviewed and updated as appropriate: allergies, current medications, past family history, past medical history, past social history, past surgical history, and problem list.    Review of Systems   Constitutional:  Negative for fever.   HENT:  Positive for sore throat. Negative for rhinorrhea, sinus pressure and sinus pain.    Respiratory:  Negative for cough and shortness of breath.    Cardiovascular:  Negative for chest pain and leg swelling.   Gastrointestinal:  Negative for abdominal pain, constipation, diarrhea and nausea.     /80   Pulse 69   Temp 98.5 °F (36.9 °C)   Ht 175.3 cm (69\")   Wt 115 kg (253 lb 3.2 oz)   SpO2 99%   BMI 37.39 kg/m²     Objective   Physical Exam  Vitals reviewed.   HENT:      Mouth/Throat:      Pharynx: Posterior oropharyngeal erythema present. No oropharyngeal exudate.   Cardiovascular:      Rate and Rhythm: Normal rate and regular rhythm.   Pulmonary:      Effort: No respiratory distress.      Breath sounds: No wheezing.   Neurological:      Mental Status: He is alert and oriented to person, place, and time.         Assessment & Plan   Diagnoses and all orders for this visit:    1. Sore throat (Primary)  -     POCT rapid strep A  -     POCT SARS-CoV-2 Antigen AMBER + Flu  Strep positive.  2. Strep pharyngitis  -     amoxicillin-clavulanate (AUGMENTIN) 875-125 MG per tablet; Take 1 tablet by mouth 2 (Two) Times a Day.  Dispense: 20 tablet; Refill: 0    3. Controlled type 2 diabetes mellitus without complication, without long-term current use of insulin  -     Lipid " Panel; Future  -     Microalbumin / Creatinine Urine Ratio - Urine, Clean Catch  -     Hemoglobin A1c; Future  Continue Metformin 1000 mg po bid, Jardiance 25 mg po qd, and Januvia 100 mg po qd  4. Essential hypertension  -     Lipid Panel; Future  -     Comprehensive Metabolic Panel; Future  Continue Lisinopril 20 mg po qd BP up today but has not had his med.   5. Prostate cancer screening  -     PSA Screen; Future  6. Obesity  Working on diet and exercise.

## 2024-03-06 ENCOUNTER — TELEPHONE (OUTPATIENT)
Dept: INTERNAL MEDICINE | Facility: CLINIC | Age: 49
End: 2024-03-06
Payer: COMMERCIAL

## 2024-03-06 LAB
ALBUMIN SERPL-MCNC: 4.5 G/DL (ref 3.5–5.2)
ALBUMIN UR-MCNC: <1.2 MG/DL
ALBUMIN/GLOB SERPL: 1.7 G/DL
ALP SERPL-CCNC: 70 U/L (ref 39–117)
ALT SERPL W P-5'-P-CCNC: 28 U/L (ref 1–41)
ANION GAP SERPL CALCULATED.3IONS-SCNC: 13.1 MMOL/L (ref 5–15)
AST SERPL-CCNC: 19 U/L (ref 1–40)
BILIRUB SERPL-MCNC: 0.3 MG/DL (ref 0–1.2)
BUN SERPL-MCNC: 14 MG/DL (ref 6–20)
BUN/CREAT SERPL: 15.2 (ref 7–25)
CALCIUM SPEC-SCNC: 9.4 MG/DL (ref 8.6–10.5)
CHLORIDE SERPL-SCNC: 103 MMOL/L (ref 98–107)
CHOLEST SERPL-MCNC: 197 MG/DL (ref 0–200)
CO2 SERPL-SCNC: 23.9 MMOL/L (ref 22–29)
CREAT SERPL-MCNC: 0.92 MG/DL (ref 0.76–1.27)
CREAT UR-MCNC: 118.6 MG/DL
EGFRCR SERPLBLD CKD-EPI 2021: 102 ML/MIN/1.73
GLOBULIN UR ELPH-MCNC: 2.7 GM/DL
GLUCOSE SERPL-MCNC: 121 MG/DL (ref 65–99)
HDLC SERPL-MCNC: 30 MG/DL (ref 40–60)
LDLC SERPL CALC-MCNC: 133 MG/DL (ref 0–100)
LDLC/HDLC SERPL: 4.32 {RATIO}
MICROALBUMIN/CREAT UR: NORMAL MG/G{CREAT}
POTASSIUM SERPL-SCNC: 4.2 MMOL/L (ref 3.5–5.2)
PROT SERPL-MCNC: 7.2 G/DL (ref 6–8.5)
PSA SERPL-MCNC: 0.96 NG/ML (ref 0–4)
SODIUM SERPL-SCNC: 140 MMOL/L (ref 136–145)
TRIGL SERPL-MCNC: 187 MG/DL (ref 0–150)
VLDLC SERPL-MCNC: 34 MG/DL (ref 5–40)

## 2024-03-06 NOTE — TELEPHONE ENCOUNTER
Spoke with patient. He states that he is taking Januvia and Metformin. Patient asked me to call back if any changes were made.

## 2024-03-06 NOTE — TELEPHONE ENCOUNTER
----- Message from Deidra Hurtado DO sent at 3/6/2024  8:02 AM EST -----  Is diabetes is not controlled. A1c is 8.3. Is he taking his metformin and Januvia????Send back so I know and can make changes to his med  Needs 3 m f/u

## 2024-03-07 DIAGNOSIS — E11.9 CONTROLLED TYPE 2 DIABETES MELLITUS WITHOUT COMPLICATION, WITHOUT LONG-TERM CURRENT USE OF INSULIN: Primary | ICD-10-CM

## 2024-03-07 RX ORDER — SEMAGLUTIDE 1.34 MG/ML
0.25 INJECTION, SOLUTION SUBCUTANEOUS WEEKLY
Qty: 2 ML | Refills: 0 | Status: SHIPPED | OUTPATIENT
Start: 2024-03-07

## 2024-03-07 NOTE — TELEPHONE ENCOUNTER
Spoke with patient, he would like to try Ozempic. Please send to [facility] on [facility]

## 2024-03-08 ENCOUNTER — PRIOR AUTHORIZATION (OUTPATIENT)
Dept: INTERNAL MEDICINE | Facility: CLINIC | Age: 49
End: 2024-03-08
Payer: COMMERCIAL

## 2024-09-10 ENCOUNTER — HOSPITAL ENCOUNTER (OUTPATIENT)
Dept: ULTRASOUND IMAGING | Facility: HOSPITAL | Age: 49
Discharge: HOME | End: 2024-09-10
Payer: COMMERCIAL

## 2024-09-10 ENCOUNTER — OFFICE VISIT (OUTPATIENT)
Dept: INTERNAL MEDICINE | Facility: CLINIC | Age: 49
End: 2024-09-10
Payer: COMMERCIAL

## 2024-09-10 ENCOUNTER — LAB (OUTPATIENT)
Dept: LAB | Facility: HOSPITAL | Age: 49
End: 2024-09-10
Payer: COMMERCIAL

## 2024-09-10 VITALS
HEIGHT: 69 IN | OXYGEN SATURATION: 99 % | TEMPERATURE: 98.2 F | DIASTOLIC BLOOD PRESSURE: 90 MMHG | WEIGHT: 251.6 LBS | BODY MASS INDEX: 37.26 KG/M2 | SYSTOLIC BLOOD PRESSURE: 182 MMHG

## 2024-09-10 DIAGNOSIS — E78.5 HYPERLIPIDEMIA LDL GOAL <70: ICD-10-CM

## 2024-09-10 DIAGNOSIS — E11.65 TYPE 2 DIABETES MELLITUS WITH HYPERGLYCEMIA, WITHOUT LONG-TERM CURRENT USE OF INSULIN: ICD-10-CM

## 2024-09-10 DIAGNOSIS — N50.812 LEFT TESTICULAR PAIN: ICD-10-CM

## 2024-09-10 DIAGNOSIS — I10 ESSENTIAL HYPERTENSION: ICD-10-CM

## 2024-09-10 DIAGNOSIS — E78.5 HYPERLIPIDEMIA LDL GOAL <70: Primary | ICD-10-CM

## 2024-09-10 LAB
ALBUMIN SERPL-MCNC: 4.5 G/DL (ref 3.5–5.2)
ALBUMIN/GLOB SERPL: 1.8 G/DL
ALP SERPL-CCNC: 80 U/L (ref 39–117)
ALT SERPL W P-5'-P-CCNC: 19 U/L (ref 1–41)
ANION GAP SERPL CALCULATED.3IONS-SCNC: 13.1 MMOL/L (ref 5–15)
AST SERPL-CCNC: 17 U/L (ref 1–40)
BILIRUB SERPL-MCNC: 0.5 MG/DL (ref 0–1.2)
BUN SERPL-MCNC: 10 MG/DL (ref 6–20)
BUN/CREAT SERPL: 9.7 (ref 7–25)
CALCIUM SPEC-SCNC: 9.3 MG/DL (ref 8.6–10.5)
CHLORIDE SERPL-SCNC: 99 MMOL/L (ref 98–107)
CHOLEST SERPL-MCNC: 195 MG/DL (ref 0–200)
CO2 SERPL-SCNC: 25.9 MMOL/L (ref 22–29)
CREAT SERPL-MCNC: 1.03 MG/DL (ref 0.76–1.27)
EGFRCR SERPLBLD CKD-EPI 2021: 88.5 ML/MIN/1.73
GLOBULIN UR ELPH-MCNC: 2.5 GM/DL
GLUCOSE SERPL-MCNC: 204 MG/DL (ref 65–99)
HBA1C MFR BLD: 7.2 % (ref 4.8–5.6)
HDLC SERPL-MCNC: 44 MG/DL (ref 40–60)
LDLC SERPL CALC-MCNC: 138 MG/DL (ref 0–100)
LDLC/HDLC SERPL: 3.1 {RATIO}
POTASSIUM SERPL-SCNC: 4.3 MMOL/L (ref 3.5–5.2)
PROT SERPL-MCNC: 7 G/DL (ref 6–8.5)
PSA SERPL-MCNC: 0.66 NG/ML (ref 0–4)
SODIUM SERPL-SCNC: 138 MMOL/L (ref 136–145)
TRIGL SERPL-MCNC: 72 MG/DL (ref 0–150)
VLDLC SERPL-MCNC: 13 MG/DL (ref 5–40)

## 2024-09-10 PROCEDURE — 80061 LIPID PANEL: CPT

## 2024-09-10 PROCEDURE — 93976 VASCULAR STUDY: CPT

## 2024-09-10 PROCEDURE — 99214 OFFICE O/P EST MOD 30 MIN: CPT | Performed by: INTERNAL MEDICINE

## 2024-09-10 PROCEDURE — 80053 COMPREHEN METABOLIC PANEL: CPT

## 2024-09-10 PROCEDURE — 76870 US EXAM SCROTUM: CPT

## 2024-09-10 PROCEDURE — 84153 ASSAY OF PSA TOTAL: CPT

## 2024-09-10 PROCEDURE — 83036 HEMOGLOBIN GLYCOSYLATED A1C: CPT

## 2024-09-10 RX ORDER — LISINOPRIL 20 MG/1
20 TABLET ORAL
Qty: 30 TABLET | Refills: 3 | Status: SHIPPED | OUTPATIENT
Start: 2024-09-10

## 2024-09-10 RX ORDER — ATORVASTATIN CALCIUM 80 MG/1
80 TABLET, FILM COATED ORAL NIGHTLY
Qty: 30 TABLET | Refills: 5 | Status: SHIPPED | OUTPATIENT
Start: 2024-09-10

## 2024-09-10 NOTE — PROGRESS NOTES
"Subjective   Kee Gutierrez is a 49 y.o. male.   Chief Complaint   Patient presents with    Back Pain    Testicle Pain    Hypertension    Diabetes       History of Present Illness   Acute: Severe low back pain at SI joint that started Sat. No trauma. No urinary frequency or pain. No fever.  Left testicular pain that started Saturday. Comes and goes.     Chronic conditions: HTN and DM. He stopped on his own all his medications (cholesterol, blood pressure, and diabetic). Discussed today risk of stroke, heart attack , and kidney failure if these conditions are not controlled. He agrees to restart medications.   The following portions of the patient's history were reviewed and updated as appropriate: allergies, current medications, past family history, past medical history, past social history, past surgical history, and problem list.    Review of Systems   Constitutional:  Negative for fever.   Respiratory:  Negative for cough and shortness of breath.    Cardiovascular:  Negative for chest pain and leg swelling.   Gastrointestinal:  Negative for abdominal pain.   Genitourinary:  Positive for testicular pain. Negative for difficulty urinating, dysuria and frequency.   Musculoskeletal:  Positive for back pain.     BP (!) 182/90 (BP Location: Left arm, Patient Position: Sitting, Cuff Size: Adult)   Temp 98.2 °F (36.8 °C) (Temporal)   Ht 175 cm (68.9\")   Wt 114 kg (251 lb 9.6 oz)   SpO2 99%   BMI 37.26 kg/m²     Objective   Physical Exam  Vitals reviewed.   Cardiovascular:      Rate and Rhythm: Normal rate and regular rhythm.   Pulmonary:      Effort: No respiratory distress.      Breath sounds: No wheezing.   Neurological:      Mental Status: He is alert and oriented to person, place, and time.   Psychiatric:         Behavior: Behavior normal.         Assessment & Plan   Diagnoses and all orders for this visit:    1. Hyperlipidemia LDL goal <70 (Primary)  -     Lipid Panel; Future  -     atorvastatin (LIPITOR) " 80 MG tablet; Take 1 tablet by mouth Every Night.  Dispense: 30 tablet; Refill: 5  Restart Lipitor 80 mg po qhs  2. Essential hypertension  -     Comprehensive Metabolic Panel; Future  -     lisinopril (PRINIVIL,ZESTRIL) 20 MG tablet; Take 1 tablet by mouth Daily.  Dispense: 30 tablet; Refill: 3  Restart Lisinopril 20 mg po qd 3 week f/u  3. Type 2 diabetes mellitus with hyperglycemia, without long-term current use of insulin  -     empagliflozin (Jardiance) 25 MG tablet tablet; Take 1 tablet by mouth Daily.  Dispense: 90 tablet; Refill: 1  -     metFORMIN (GLUCOPHAGE) 1000 MG tablet; Take 1 tablet by mouth 2 (Two) Times a Day With Meals.  Dispense: 180 tablet; Refill: 0  -     Hemoglobin A1c; Future  Restart Jardiance 25 mg po qd and Metformin 1000 mg bid  4. Left testicular pain  -     US Testicular or Ovarian Vascular Limited; Future  -     PSA DIAGNOSTIC; Future    Other orders  -     Cancel: POC Glycosylated Hemoglobin (Hb A1C)

## 2024-09-11 ENCOUNTER — TELEPHONE (OUTPATIENT)
Dept: INTERNAL MEDICINE | Facility: CLINIC | Age: 49
End: 2024-09-11
Payer: COMMERCIAL

## 2024-09-11 ENCOUNTER — TELEPHONE (OUTPATIENT)
Dept: BEHAVIORAL HEALTH | Facility: CLINIC | Age: 49
End: 2024-09-11
Payer: COMMERCIAL

## 2024-09-11 DIAGNOSIS — N50.812 LEFT TESTICULAR PAIN: Primary | ICD-10-CM

## 2024-09-11 NOTE — TELEPHONE ENCOUNTER
----- Message from Deidra Hurtado sent at 9/11/2024  7:38 AM EDT -----  Small hydrocele. No torsion. This is not source of pain.   Is he feeling better.  Needs to restart all his med that were sent in and make sure has 3 week follow up

## 2024-09-12 ENCOUNTER — OFFICE VISIT (OUTPATIENT)
Dept: INTERNAL MEDICINE | Facility: CLINIC | Age: 49
End: 2024-09-12
Payer: COMMERCIAL

## 2024-09-12 VITALS
TEMPERATURE: 97.9 F | HEIGHT: 69 IN | BODY MASS INDEX: 37.18 KG/M2 | DIASTOLIC BLOOD PRESSURE: 86 MMHG | SYSTOLIC BLOOD PRESSURE: 148 MMHG | WEIGHT: 251 LBS | OXYGEN SATURATION: 97 %

## 2024-09-12 DIAGNOSIS — M54.42 ACUTE LEFT-SIDED LOW BACK PAIN WITH LEFT-SIDED SCIATICA: Primary | ICD-10-CM

## 2024-09-12 DIAGNOSIS — I10 ESSENTIAL HYPERTENSION: ICD-10-CM

## 2024-09-12 PROCEDURE — 99213 OFFICE O/P EST LOW 20 MIN: CPT | Performed by: INTERNAL MEDICINE

## 2024-09-12 NOTE — PROGRESS NOTES
"Subjective   Kee Gutierrez is a 49 y.o. male.   Chief Complaint   Patient presents with    Back Pain       History of Present Illness   Seen previous for left testicular pain and sent for us that did not show torsion. aHas small hydroceles. Referral placed for urology for his testicular pain. Low back pain that radiates to his left buttock. NSAIDS did help yesterday.   The following portions of the patient's history were reviewed and updated as appropriate: allergies, current medications, past family history, past medical history, past social history, past surgical history, and problem list.    Review of Systems   Constitutional:  Negative for fatigue and fever.   Respiratory:  Negative for cough and shortness of breath.    Genitourinary:  Negative for dysuria and frequency.   Musculoskeletal:  Positive for back pain.     /86   Temp 97.9 °F (36.6 °C) (Temporal)   Ht 175 cm (68.9\") Comment: noted from previous encounter  Wt 114 kg (251 lb) Comment: patient refused noted from previous encounter  SpO2 97%   BMI 37.18 kg/m²     Objective   Physical Exam  Vitals reviewed.   Cardiovascular:      Rate and Rhythm: Normal rate and regular rhythm.   Pulmonary:      Effort: No respiratory distress.      Breath sounds: No wheezing.   Neurological:      Mental Status: He is alert and oriented to person, place, and time.         Assessment & Plan   Diagnoses and all orders for this visit:    1. Acute left-sided low back pain with left-sided sciatica (Primary)  -     Ambulatory Referral to Physical Therapy for Evaluation & Treatment    2. Essential hypertension  His BP has started coming down. He has just restarted his lisinopril  a few days ago.  Has f/u in 2 weeks               "

## 2024-09-20 ENCOUNTER — PATIENT OUTREACH (OUTPATIENT)
Dept: CASE MANAGEMENT | Facility: OTHER | Age: 49
End: 2024-09-20
Payer: COMMERCIAL

## 2024-09-23 ENCOUNTER — PATIENT OUTREACH (OUTPATIENT)
Dept: CASE MANAGEMENT | Facility: OTHER | Age: 49
End: 2024-09-23
Payer: COMMERCIAL

## 2024-10-01 ENCOUNTER — OFFICE VISIT (OUTPATIENT)
Dept: INTERNAL MEDICINE | Facility: CLINIC | Age: 49
End: 2024-10-01
Payer: COMMERCIAL

## 2024-10-01 VITALS
OXYGEN SATURATION: 98 % | HEIGHT: 69 IN | BODY MASS INDEX: 36.73 KG/M2 | DIASTOLIC BLOOD PRESSURE: 94 MMHG | SYSTOLIC BLOOD PRESSURE: 146 MMHG | WEIGHT: 248 LBS | TEMPERATURE: 97.9 F

## 2024-10-01 DIAGNOSIS — I10 ESSENTIAL HYPERTENSION: Primary | ICD-10-CM

## 2024-10-01 PROCEDURE — 99213 OFFICE O/P EST LOW 20 MIN: CPT | Performed by: INTERNAL MEDICINE

## 2024-10-01 NOTE — PROGRESS NOTES
"Subjective   Kee Gutierrez is a 49 y.o. male.     History of Present Illness   F/u on HTN. Is currently taking Lisinopril without side effects. No CP or SOA. No HA. He did not take his BP med today so his pressure is up. He will take it as soon as he gets back home today. No HA or weakness.   The following portions of the patient's history were reviewed and updated as appropriate: allergies, current medications, past family history, past medical history, past social history, past surgical history, and problem list.    Review of Systems   Constitutional:  Negative for fatigue and fever.   Respiratory:  Negative for cough and shortness of breath.    Cardiovascular:  Negative for chest pain and leg swelling.   Neurological:  Negative for weakness, numbness and headaches.     /94 (BP Location: Left arm, Patient Position: Sitting, Cuff Size: Adult)   Temp 97.9 °F (36.6 °C) (Temporal)   Ht 175 cm (68.9\")   Wt 112 kg (248 lb)   SpO2 98%   BMI 36.73 kg/m²     Objective   Physical Exam  Vitals reviewed.   Cardiovascular:      Rate and Rhythm: Normal rate and regular rhythm.   Pulmonary:      Effort: No respiratory distress.      Breath sounds: No wheezing.   Neurological:      Mental Status: He is alert and oriented to person, place, and time.         Assessment & Plan   Diagnoses and all orders for this visit:    1. Essential hypertension (Primary  BP has been improving with Lisinopril but he did not take it today because he did not eat breakfast. He will take Lisinopril after he leaves here. 3 week f/u and will take med on day of his next visit.                "

## 2024-10-03 ENCOUNTER — PATIENT OUTREACH (OUTPATIENT)
Dept: CASE MANAGEMENT | Facility: OTHER | Age: 49
End: 2024-10-03
Payer: COMMERCIAL

## 2024-10-03 NOTE — OUTREACH NOTE
AMBULATORY CASE MANAGEMENT NOTE    Unable to reach patient to confirm if he would like to participate in [facility]. Left VM with my contact information should patient choose to participate. HRCM closed.       Zahida VICENTE  Ambulatory Case Management    10/3/2024, 15:36 EDT

## 2024-10-15 ENCOUNTER — OFFICE VISIT (OUTPATIENT)
Dept: UROLOGY | Facility: CLINIC | Age: 49
End: 2024-10-15
Payer: COMMERCIAL

## 2024-10-15 DIAGNOSIS — N50.819 PAIN IN TESTICLE, UNSPECIFIED LATERALITY: ICD-10-CM

## 2024-10-15 DIAGNOSIS — R10.9 FLANK PAIN: Primary | ICD-10-CM

## 2024-10-15 NOTE — PROGRESS NOTES
Office Visit [facility]      Patient Name: Kee Gutierrez  : 1975   MRN: [id number]     Chief Complaint:    Chief Complaint   Patient presents with    Left testicular pain       Referring Provider: Deidra Hurtado DO    History of Present Illness: Kee Gutierrez is a 49 y.o. male who presents to Urology today for evaluation of intermittent groin, testicular and flank pain.  Patient denies prior history of stone disease.  He reports intermittent nature to symptoms.  Denies bothersome baseline lower urinary tract symptoms.  Denies dysuria or hematuria.  Current IPSS 6.    Scrotal ultrasound performed 2024 revealing no intratesticular lesion, intact bilateral blood flow.      Subjective      Review of System: Review of Systems   Genitourinary:  Negative for decreased urine volume, difficulty urinating, dysuria, enuresis, flank pain, frequency, hematuria and urgency.      I have reviewed the ROS documented by my clinical staff, updated appropriately and I agree. Ziyad Oleary MD    Past Medical History:   Past Medical History:   Diagnosis Date    Diabetes mellitus 3/26/2022    Erectile dysfunction 2022    Headache 3/26/2022    Hyperlipidemia 3/36/2022    Hypertension     Stroke 3/26/2022       Past Surgical History: History reviewed. No pertinent surgical history.    Family History:   Family History   Problem Relation Age of Onset    Heart disease Father     Cancer Mother         uterian cancer    Hyperlipidemia Maternal Grandfather     Thyroid disease Maternal Grandfather     Dementia Maternal Grandfather     Hyperlipidemia Maternal Grandmother     Stroke Maternal Grandmother         In her 60's had some mini strokes    Thyroid disease Maternal Grandmother     Dementia Maternal Grandmother     Cancer Paternal Grandfather         lung cancer    Diabetes Paternal Grandfather     Asthma Son         very mild    Learning disabilities Son         Dyslexia    Learning disabilities Son      "    Dyslexia       Social History:   Social History     Socioeconomic History    Marital status:    Tobacco Use    Smoking status: Never     Passive exposure: Never    Smokeless tobacco: Never   Vaping Use    Vaping status: Never Used   Substance and Sexual Activity    Alcohol use: Not Currently     Comment: Haven't drank since my high school days    Drug use: Never    Sexual activity: Yes     Partners: Female     Birth control/protection: None     Comment:  - wife has already gone through menopause       Medications:     Current Outpatient Medications:     aspirin 81 MG chewable tablet, Chew 1 tablet Daily., Disp: , Rfl:     atorvastatin (LIPITOR) 80 MG tablet, Take 1 tablet by mouth Every Night., Disp: 30 tablet, Rfl: 5    lisinopril (PRINIVIL,ZESTRIL) 20 MG tablet, Take 1 tablet by mouth Daily., Disp: 30 tablet, Rfl: 3    metFORMIN (GLUCOPHAGE) 1000 MG tablet, Take 1 tablet by mouth 2 (Two) Times a Day With Meals., Disp: 180 tablet, Rfl: 0    tadalafil (CIALIS) 10 MG tablet, , Disp: , Rfl:     B-D 3CC LUER-ABHI SYR 29KQ7-5/2 22G X 1-1/2\" 3 ML misc, USE TO INJECT TESTOSTERONE INTRAMUSCULARLYEVERY 2 WEEKS. (Patient not taking: Reported on 10/15/2024), Disp: , Rfl:     empagliflozin (Jardiance) 25 MG tablet tablet, Take 1 tablet by mouth Daily. (Patient not taking: Reported on 10/15/2024), Disp: 90 tablet, Rfl: 1    Testosterone Cypionate (DEPOTESTOTERONE CYPIONATE) 200 MG/ML injection, INJECT 0.5 ML EVERY 2 WEEKS IN THE MUSCLE AS DIRECTED FOR 28 DAYS (Patient not taking: Reported on 10/15/2024), Disp: , Rfl:     Allergies:   No Known Allergies    IPSS Questionnaire (AUA-7):  Over the past month…    1)  Incomplete Emptying  How often have you had a sensation of not emptying your bladder?  0 - Not at all   2)  Frequency  How often have you had to urinate less than every two hours? 2 - Less than half the time   3)  Intermittency  How often have you found you stopped and started again several times when " you urinated?  0 - Not at all   4) Urgency  How often have you found it difficult to postpone urination?  1 - Less than 1 time in 5   5) Weak Stream  How often have you had a weak urinary stream?  0 - Not at all   6) Straining  How often have you had to push or strain to begin urination?  0 - Not at all   7) Nocturia  How many times did you typically get up at night to urinate?  3 - 3 times   Total Score:  6       Quality of life due to urinary symptoms:  If you were to spend the rest of your life with your urinary condition the way it is now, how would you feel about that? 2-Mostly Satisfied   Urine Leakage (Incontinence) 0-No Leakage       Objective     Physical Exam:   Vital Signs: There were no vitals filed for this visit.  There is no height or weight on file to calculate BMI.     Physical Exam  Vitals and nursing note reviewed.   Constitutional:       Appearance: Normal appearance.   HENT:      Head: Normocephalic and atraumatic.   Cardiovascular:      Comments: Well perfused  Pulmonary:      Effort: Pulmonary effort is normal.   Abdominal:      General: Abdomen is flat.      Palpations: Abdomen is soft.   Musculoskeletal:         General: Normal range of motion.   Skin:     General: Skin is warm and dry.   Neurological:      General: No focal deficit present.      Mental Status: He is alert and oriented to person, place, and time. Mental status is at baseline.   Psychiatric:         Mood and Affect: Mood normal.         Behavior: Behavior normal.         Thought Content: Thought content normal.         Judgment: Judgment normal.         Genitourinary  Penis: circumcised penis, glans normal, no penile discharge.  No rashes/lesions.    Testes: descended bilaterally, no masses, nontender to palpation. Remainder of scrotal contents normal. No hernia appreciated.      Labs:   Brief Urine Lab Results  (Last result in the past 365 days)        Color   Clarity   Blood   Leuk Est   Nitrite   Protein   CREAT   Urine  HCG        03/05/24 1341             118.6                      Lab Results   Component Value Date    GLUCOSE 204 (H) 09/10/2024    CALCIUM 9.3 09/10/2024     09/10/2024    K 4.3 09/10/2024    CO2 25.9 09/10/2024    CL 99 09/10/2024    BUN 10 09/10/2024    CREATININE 1.03 09/10/2024    BCR 9.7 09/10/2024    ANIONGAP 13.1 09/10/2024       Lab Results   Component Value Date    WBC 9.77 04/19/2022    HGB 15.4 04/19/2022    HCT 44.7 04/19/2022    MCV 86.1 04/19/2022     04/19/2022       Images:   US Testicular or Ovarian Vascular Limited    Result Date: 9/10/2024  Impression: 1. Negative for testicular torsion or solid mass. 2. Small bilateral hydroceles with floating echogenic debris. Electronically Signed: Claudio Beck MD  9/10/2024 2:57 PM EDT  Workstation ID: [id number]    US Scrotum & Testicles    Result Date: 9/10/2024  Impression: 1. Negative for testicular torsion or solid mass. 2. Small bilateral hydroceles with floating echogenic debris. Electronically Signed: Claudio Beck MD  9/10/2024 2:57 PM EDT  Workstation ID: [id number]      Measures:   Tobacco:   Kee Gutierrez  reports that he has never smoked. He has never been exposed to tobacco smoke. He has never used smokeless tobacco. I have educated him on the risk of diseases from using tobacco products.   Assessment / Plan      Assessment/Plan:   49 y.o. male is here today for evaluation of intermittent groin and testicular discomfort, intermittent flank pain.  We have discussed the indication to obtain cross-sectional imaging to evaluate for the potential presence of stone burden relating in symptoms.  No current bothersome lower urinary tract symptoms.  Unremarkable prior scrotal ultrasound.  We will obtain cross-sectional imaging, consider further management pending imaging study findings.  We have discussed conservative measures to improve groin and scrotal discomfort.  Will consider course of NSAID therapy pending imaging  results.    Diagnoses and all orders for this visit:    1. Flank pain (Primary)  -     CT Abdomen Pelvis Stone Protocol; Future    2. Pain in testicle, unspecified laterality    Other orders  -     LABS SCANNED           Follow Up:   Return in about 2 weeks (around 10/29/2024) for Recheck, Follow up after Imaging.    I spent approximately 45 minutes providing clinical care for this patient; including review of patient's chart and provider documentation, face to face time spent with patient in examination room (obtaining history, performing physical exam, discussing diagnosis and management options), placing orders, and completing patient documentation.     Ziyad Oleary MD  [facility] [place]

## 2024-10-19 PROBLEM — N50.819 PAIN IN TESTICLE: Status: ACTIVE | Noted: 2024-10-19

## 2024-10-19 PROBLEM — R10.9 FLANK PAIN: Status: ACTIVE | Noted: 2024-10-19

## 2024-10-29 ENCOUNTER — OFFICE VISIT (OUTPATIENT)
Dept: INTERNAL MEDICINE | Facility: CLINIC | Age: 49
End: 2024-10-29
Payer: COMMERCIAL

## 2024-10-29 VITALS
TEMPERATURE: 97.6 F | HEIGHT: 69 IN | DIASTOLIC BLOOD PRESSURE: 83 MMHG | SYSTOLIC BLOOD PRESSURE: 149 MMHG | WEIGHT: 253.6 LBS | BODY MASS INDEX: 37.56 KG/M2 | OXYGEN SATURATION: 98 %

## 2024-10-29 DIAGNOSIS — I10 ESSENTIAL HYPERTENSION: Primary | ICD-10-CM

## 2024-10-29 PROCEDURE — 99213 OFFICE O/P EST LOW 20 MIN: CPT | Performed by: INTERNAL MEDICINE

## 2024-10-29 NOTE — PROGRESS NOTES
"Subjective   Kee Gutierrez is a 49 y.o. male.   Chief Complaint   Patient presents with    Hypertension       History of Present Illness   F/u on HTN. BP is up today because he did not take his BP med this morning,. He is going to go home to take. No CP. No HA>.  The following portions of the patient's history were reviewed and updated as appropriate: allergies, current medications, past family history, past medical history, past social history, past surgical history, and problem list.    Review of Systems   Constitutional:  Negative for fatigue and fever.   Respiratory:  Negative for cough and shortness of breath.    Cardiovascular:  Negative for chest pain and leg swelling.   Neurological:  Negative for weakness and headaches.     /83   Temp 97.6 °F (36.4 °C) (Temporal)   Ht 175 cm (68.9\")   Wt 115 kg (253 lb 9.6 oz)   SpO2 98%   BMI 37.56 kg/m²     Objective   Physical Exam  Vitals reviewed.   Cardiovascular:      Rate and Rhythm: Normal rate and regular rhythm.   Pulmonary:      Effort: No respiratory distress.      Breath sounds: No wheezing.   Neurological:      Mental Status: He is alert and oriented to person, place, and time.   Psychiatric:         Behavior: Behavior normal.         Assessment & Plan   Diagnoses and all orders for this visit:    1. Essential hypertension (Primary)  He will take his lisinopril when he gets home and check BP in 2 ours. 4 week f/u . He will take his BP med before coming in for f/u appointment.                "

## 2024-12-06 ENCOUNTER — APPOINTMENT (OUTPATIENT)
Dept: GENERAL RADIOLOGY | Facility: HOSPITAL | Age: 49
End: 2024-12-06
Payer: COMMERCIAL

## 2024-12-06 ENCOUNTER — HOSPITAL ENCOUNTER (EMERGENCY)
Facility: HOSPITAL | Age: 49
Discharge: HOME | End: 2024-12-07
Attending: EMERGENCY MEDICINE
Payer: COMMERCIAL

## 2024-12-06 DIAGNOSIS — E86.0 DEHYDRATION: ICD-10-CM

## 2024-12-06 DIAGNOSIS — K29.00 ACUTE SUPERFICIAL GASTRITIS WITHOUT HEMORRHAGE: Primary | ICD-10-CM

## 2024-12-06 DIAGNOSIS — R10.13 EPIGASTRIC PAIN: ICD-10-CM

## 2024-12-06 DIAGNOSIS — M62.838 MUSCLE SPASM: ICD-10-CM

## 2024-12-06 DIAGNOSIS — I10 PRIMARY HYPERTENSION: ICD-10-CM

## 2024-12-06 DIAGNOSIS — R73.9 HYPERGLYCEMIA: ICD-10-CM

## 2024-12-06 LAB
BASOPHILS # BLD AUTO: 0.05 10*3/MM3 (ref 0–0.2)
BASOPHILS NFR BLD AUTO: 0.2 % (ref 0–1.5)
DEPRECATED RDW RBC AUTO: 39.4 FL (ref 37–54)
EOSINOPHIL # BLD AUTO: 0.16 10*3/MM3 (ref 0–0.4)
EOSINOPHIL NFR BLD AUTO: 0.6 % (ref 0.3–6.2)
ERYTHROCYTE [DISTWIDTH] IN BLOOD BY AUTOMATED COUNT: 12.9 % (ref 12.3–15.4)
HCT VFR BLD AUTO: 46.1 % (ref 37.5–51)
HGB BLD-MCNC: 16.5 G/DL (ref 13–17.7)
IMM GRANULOCYTES # BLD AUTO: 0.26 10*3/MM3 (ref 0–0.05)
IMM GRANULOCYTES NFR BLD AUTO: 0.9 % (ref 0–0.5)
LYMPHOCYTES # BLD AUTO: 1.87 10*3/MM3 (ref 0.7–3.1)
LYMPHOCYTES NFR BLD AUTO: 6.8 % (ref 19.6–45.3)
MCH RBC QN AUTO: 30.1 PG (ref 26.6–33)
MCHC RBC AUTO-ENTMCNC: 35.8 G/DL (ref 31.5–35.7)
MCV RBC AUTO: 84 FL (ref 79–97)
MONOCYTES # BLD AUTO: 2.45 10*3/MM3 (ref 0.1–0.9)
MONOCYTES NFR BLD AUTO: 8.9 % (ref 5–12)
NEUTROPHILS NFR BLD AUTO: 22.65 10*3/MM3 (ref 1.7–7)
NEUTROPHILS NFR BLD AUTO: 82.6 % (ref 42.7–76)
NRBC BLD AUTO-RTO: 0 /100 WBC (ref 0–0.2)
PLATELET # BLD AUTO: 258 10*3/MM3 (ref 140–450)
PMV BLD AUTO: 10.8 FL (ref 6–12)
RBC # BLD AUTO: 5.49 10*6/MM3 (ref 4.14–5.8)
WBC NRBC COR # BLD AUTO: 27.44 10*3/MM3 (ref 3.4–10.8)

## 2024-12-06 PROCEDURE — 85025 COMPLETE CBC W/AUTO DIFF WBC: CPT | Performed by: EMERGENCY MEDICINE

## 2024-12-06 PROCEDURE — 96374 THER/PROPH/DIAG INJ IV PUSH: CPT

## 2024-12-06 PROCEDURE — 80053 COMPREHEN METABOLIC PANEL: CPT | Performed by: EMERGENCY MEDICINE

## 2024-12-06 PROCEDURE — 99285 EMERGENCY DEPT VISIT HI MDM: CPT

## 2024-12-06 PROCEDURE — 83880 ASSAY OF NATRIURETIC PEPTIDE: CPT | Performed by: EMERGENCY MEDICINE

## 2024-12-06 PROCEDURE — 84484 ASSAY OF TROPONIN QUANT: CPT | Performed by: EMERGENCY MEDICINE

## 2024-12-06 PROCEDURE — 82010 KETONE BODYS QUAN: CPT | Performed by: EMERGENCY MEDICINE

## 2024-12-06 PROCEDURE — 25010000002 MIDAZOLAM PER 1 MG: Performed by: EMERGENCY MEDICINE

## 2024-12-06 PROCEDURE — 84145 PROCALCITONIN (PCT): CPT | Performed by: EMERGENCY MEDICINE

## 2024-12-06 PROCEDURE — 36415 COLL VENOUS BLD VENIPUNCTURE: CPT

## 2024-12-06 PROCEDURE — 71045 X-RAY EXAM CHEST 1 VIEW: CPT

## 2024-12-06 PROCEDURE — 83690 ASSAY OF LIPASE: CPT | Performed by: EMERGENCY MEDICINE

## 2024-12-06 PROCEDURE — 93005 ELECTROCARDIOGRAM TRACING: CPT | Performed by: EMERGENCY MEDICINE

## 2024-12-06 RX ORDER — MIDAZOLAM HYDROCHLORIDE 1 MG/ML
2 INJECTION, SOLUTION INTRAMUSCULAR; INTRAVENOUS ONCE
Status: COMPLETED | OUTPATIENT
Start: 2024-12-06 | End: 2024-12-06

## 2024-12-06 RX ORDER — SODIUM CHLORIDE 0.9 % (FLUSH) 0.9 %
10 SYRINGE (ML) INJECTION AS NEEDED
Status: DISCONTINUED | OUTPATIENT
Start: 2024-12-06 | End: 2024-12-07 | Stop reason: HOSPADM

## 2024-12-06 RX ADMIN — MIDAZOLAM 2 MG: 1 INJECTION INTRAMUSCULAR; INTRAVENOUS at 23:43

## 2024-12-07 ENCOUNTER — APPOINTMENT (OUTPATIENT)
Dept: CT IMAGING | Facility: HOSPITAL | Age: 49
End: 2024-12-07
Payer: COMMERCIAL

## 2024-12-07 VITALS
DIASTOLIC BLOOD PRESSURE: 94 MMHG | OXYGEN SATURATION: 99 % | RESPIRATION RATE: 18 BRPM | WEIGHT: 240 LBS | BODY MASS INDEX: 35.55 KG/M2 | HEART RATE: 95 BPM | TEMPERATURE: 98.1 F | SYSTOLIC BLOOD PRESSURE: 135 MMHG | HEIGHT: 69 IN

## 2024-12-07 LAB
ALBUMIN SERPL-MCNC: 4.9 G/DL (ref 3.5–5.2)
ALBUMIN/GLOB SERPL: 1.6 G/DL
ALP SERPL-CCNC: 81 U/L (ref 39–117)
ALT SERPL W P-5'-P-CCNC: 25 U/L (ref 1–41)
ANION GAP SERPL CALCULATED.3IONS-SCNC: 22 MMOL/L (ref 5–15)
AST SERPL-CCNC: 22 U/L (ref 1–40)
ATMOSPHERIC PRESS: ABNORMAL MM[HG]
B-OH-BUTYR SERPL-SCNC: 0.45 MMOL/L (ref 0.02–0.27)
BASE EXCESS BLDV CALC-SCNC: 1.3 MMOL/L (ref -2–2)
BASOPHILS # BLD AUTO: 0.03 10*3/MM3 (ref 0–0.2)
BASOPHILS NFR BLD AUTO: 0.2 % (ref 0–1.5)
BDY SITE: ABNORMAL
BILIRUB SERPL-MCNC: 0.6 MG/DL (ref 0–1.2)
BILIRUB UR QL STRIP: NEGATIVE
BODY TEMPERATURE: 37
BUN SERPL-MCNC: 21 MG/DL (ref 6–20)
BUN/CREAT SERPL: 19.4 (ref 7–25)
CALCIUM SPEC-SCNC: 9.9 MG/DL (ref 8.6–10.5)
CHLORIDE SERPL-SCNC: 99 MMOL/L (ref 98–107)
CLARITY UR: CLEAR
CO2 BLDA-SCNC: 27.1 MMOL/L (ref 22–33)
CO2 SERPL-SCNC: 17 MMOL/L (ref 22–29)
COHGB MFR BLD: 1.1 %
COLOR UR: YELLOW
CREAT SERPL-MCNC: 1.08 MG/DL (ref 0.76–1.27)
D-LACTATE SERPL-SCNC: 2.1 MMOL/L (ref 0.5–2)
D-LACTATE SERPL-SCNC: 2.2 MMOL/L (ref 0.5–2)
DEPRECATED RDW RBC AUTO: 39.8 FL (ref 37–54)
EGFRCR SERPLBLD CKD-EPI 2021: 83.6 ML/MIN/1.73
EOSINOPHIL # BLD AUTO: 0.02 10*3/MM3 (ref 0–0.4)
EOSINOPHIL NFR BLD AUTO: 0.1 % (ref 0.3–6.2)
EPAP: 0
ERYTHROCYTE [DISTWIDTH] IN BLOOD BY AUTOMATED COUNT: 12.9 % (ref 12.3–15.4)
GLOBULIN UR ELPH-MCNC: 3.1 GM/DL
GLUCOSE SERPL-MCNC: 268 MG/DL (ref 65–99)
GLUCOSE UR STRIP-MCNC: ABNORMAL MG/DL
HCO3 BLDV-SCNC: 25.9 MMOL/L (ref 22–28)
HCT VFR BLD AUTO: 43.2 % (ref 37.5–51)
HGB BLD-MCNC: 15.3 G/DL (ref 13–17.7)
HGB BLDA-MCNC: 15.8 G/DL (ref 13.5–17.5)
HGB UR QL STRIP.AUTO: NEGATIVE
IMM GRANULOCYTES # BLD AUTO: 0.07 10*3/MM3 (ref 0–0.05)
IMM GRANULOCYTES NFR BLD AUTO: 0.4 % (ref 0–0.5)
INHALED O2 CONCENTRATION: 21 %
IPAP: 0
KETONES UR QL STRIP: ABNORMAL
LEUKOCYTE ESTERASE UR QL STRIP.AUTO: NEGATIVE
LIPASE SERPL-CCNC: 31 U/L (ref 13–60)
LYMPHOCYTES # BLD AUTO: 0.27 10*3/MM3 (ref 0.7–3.1)
LYMPHOCYTES NFR BLD AUTO: 1.4 % (ref 19.6–45.3)
MCH RBC QN AUTO: 30.2 PG (ref 26.6–33)
MCHC RBC AUTO-ENTMCNC: 35.4 G/DL (ref 31.5–35.7)
MCV RBC AUTO: 85.4 FL (ref 79–97)
METHGB BLD QL: 0.1 %
MODALITY: ABNORMAL
MONOCYTES # BLD AUTO: 0.95 10*3/MM3 (ref 0.1–0.9)
MONOCYTES NFR BLD AUTO: 5 % (ref 5–12)
NEUTROPHILS NFR BLD AUTO: 17.53 10*3/MM3 (ref 1.7–7)
NEUTROPHILS NFR BLD AUTO: 92.9 % (ref 42.7–76)
NITRITE UR QL STRIP: NEGATIVE
NRBC BLD AUTO-RTO: 0 /100 WBC (ref 0–0.2)
NT-PROBNP SERPL-MCNC: 125.9 PG/ML (ref 0–900)
OXYHGB MFR BLDV: 71.7 %
PAW @ PEAK INSP FLOW SETTING VENT: 0 CMH2O
PCO2 BLDV: 39.8 MM HG (ref 41–51)
PH BLDV: 7.42 PH UNITS (ref 7.31–7.41)
PH UR STRIP.AUTO: <=5 [PH] (ref 5–8)
PLATELET # BLD AUTO: 197 10*3/MM3 (ref 140–450)
PMV BLD AUTO: 10.6 FL (ref 6–12)
PO2 BLDV: 37.1 MM HG (ref 27–53)
POTASSIUM SERPL-SCNC: 3.7 MMOL/L (ref 3.5–5.2)
PROCALCITONIN SERPL-MCNC: 0.1 NG/ML (ref 0–0.25)
PROT SERPL-MCNC: 8 G/DL (ref 6–8.5)
PROT UR QL STRIP: NEGATIVE
RBC # BLD AUTO: 5.06 10*6/MM3 (ref 4.14–5.8)
SODIUM SERPL-SCNC: 138 MMOL/L (ref 136–145)
SP GR UR STRIP: 1.08 (ref 1–1.03)
TOTAL RATE: 0 BREATHS/MINUTE
TROPONIN T SERPL HS-MCNC: 10 NG/L
UROBILINOGEN UR QL STRIP: ABNORMAL
WBC NRBC COR # BLD AUTO: 18.87 10*3/MM3 (ref 3.4–10.8)

## 2024-12-07 PROCEDURE — 36415 COLL VENOUS BLD VENIPUNCTURE: CPT

## 2024-12-07 PROCEDURE — 82805 BLOOD GASES W/O2 SATURATION: CPT

## 2024-12-07 PROCEDURE — 83605 ASSAY OF LACTIC ACID: CPT | Performed by: EMERGENCY MEDICINE

## 2024-12-07 PROCEDURE — 74177 CT ABD & PELVIS W/CONTRAST: CPT

## 2024-12-07 PROCEDURE — 81003 URINALYSIS AUTO W/O SCOPE: CPT | Performed by: EMERGENCY MEDICINE

## 2024-12-07 PROCEDURE — 25510000001 IOPAMIDOL 61 % SOLUTION: Performed by: EMERGENCY MEDICINE

## 2024-12-07 PROCEDURE — 85025 COMPLETE CBC W/AUTO DIFF WBC: CPT | Performed by: EMERGENCY MEDICINE

## 2024-12-07 PROCEDURE — 25010000002 LORAZEPAM PER 2 MG: Performed by: EMERGENCY MEDICINE

## 2024-12-07 PROCEDURE — 96375 TX/PRO/DX INJ NEW DRUG ADDON: CPT

## 2024-12-07 PROCEDURE — 25810000003 SODIUM CHLORIDE 0.9 % SOLUTION: Performed by: EMERGENCY MEDICINE

## 2024-12-07 RX ORDER — IOPAMIDOL 612 MG/ML
100 INJECTION, SOLUTION INTRAVASCULAR
Status: COMPLETED | OUTPATIENT
Start: 2024-12-07 | End: 2024-12-07

## 2024-12-07 RX ORDER — LORAZEPAM 2 MG/ML
1 INJECTION INTRAMUSCULAR ONCE
Status: COMPLETED | OUTPATIENT
Start: 2024-12-07 | End: 2024-12-07

## 2024-12-07 RX ORDER — PROMETHAZINE HYDROCHLORIDE 25 MG/1
25 TABLET ORAL EVERY 8 HOURS PRN
Qty: 9 TABLET | Refills: 0 | Status: SHIPPED | OUTPATIENT
Start: 2024-12-07 | End: 2024-12-10

## 2024-12-07 RX ADMIN — LORAZEPAM 1 MG: 2 INJECTION INTRAMUSCULAR; INTRAVENOUS at 04:28

## 2024-12-07 RX ADMIN — SODIUM CHLORIDE 1000 ML: 9 INJECTION, SOLUTION INTRAVENOUS at 01:42

## 2024-12-07 RX ADMIN — IOPAMIDOL 80 ML: 612 INJECTION, SOLUTION INTRAVENOUS at 00:55

## 2024-12-07 NOTE — ED PROVIDER NOTES
Subjective   History of Present Illness  This is a 49-year-old male with past medical history of diabetes and hypertension presenting to the emergency department with numerous complaints.  Patient states that he has been having some abdominal pain.  Mainly in the right flank and throughout the front of his abdomen.  Has been very nauseous.  Denies any vomiting.  The patient has also been endorsing some shortness of breath.  Nonproductive cough.  States that over the last 24 hours he has had some numbness in his arms, more pronounced on the left side.  He has had some chills and cold sweats.  Denies any fevers.    History provided by:  Patient   used: No        Review of Systems   Constitutional:  Positive for chills and diaphoresis. Negative for fever.   HENT:  Negative for congestion, ear pain and sore throat.    Eyes:  Negative for visual disturbance.   Respiratory:  Positive for cough. Negative for shortness of breath.    Cardiovascular:  Negative for chest pain.   Gastrointestinal:  Positive for abdominal pain and nausea.   Genitourinary:  Negative for difficulty urinating.   Musculoskeletal:  Positive for myalgias. Negative for arthralgias.   Skin:  Negative for rash.   Neurological:  Positive for light-headedness and numbness. Negative for dizziness and weakness.   Psychiatric/Behavioral:  Negative for agitation. The patient is nervous/anxious.        Past Medical History:   Diagnosis Date    Diabetes mellitus 3/26/2022    Erectile dysfunction 1/1/2022    Headache 3/26/2022    Hyperlipidemia 3/36/2022    Hypertension     Stroke 3/26/2022       No Known Allergies    No past surgical history on file.    Family History   Problem Relation Age of Onset    Heart disease Father     Cancer Mother         uterian cancer    Hyperlipidemia Maternal Grandfather     Thyroid disease Maternal Grandfather     Dementia Maternal Grandfather     Hyperlipidemia Maternal Grandmother     Stroke Maternal  Grandmother         In her 60's had some mini strokes    Thyroid disease Maternal Grandmother     Dementia Maternal Grandmother     Cancer Paternal Grandfather         lung cancer    Diabetes Paternal Grandfather     Asthma Son         very mild    Learning disabilities Son         Dyslexia    Learning disabilities Son         Dyslexia       Social History     Socioeconomic History    Marital status:    Tobacco Use    Smoking status: Never     Passive exposure: Never    Smokeless tobacco: Never   Vaping Use    Vaping status: Never Used   Substance and Sexual Activity    Alcohol use: Not Currently     Comment: Haven't drank since my high school days    Drug use: Never    Sexual activity: Yes     Partners: Female     Birth control/protection: None     Comment:  - wife has already gone through menopause           Objective   Physical Exam  Vitals and nursing note reviewed.   Constitutional:       General: He is not in acute distress.     Appearance: He is not ill-appearing or toxic-appearing.   HENT:      Mouth/Throat:      Pharynx: No posterior oropharyngeal erythema.   Eyes:      Extraocular Movements: Extraocular movements intact.      Pupils: Pupils are equal, round, and reactive to light.   Cardiovascular:      Rate and Rhythm: Regular rhythm. Tachycardia present.   Pulmonary:      Effort: Pulmonary effort is normal. No respiratory distress.   Abdominal:      General: Abdomen is flat. There is no distension.      Palpations: There is no mass.      Tenderness: There is no guarding or rebound.      Comments: Mild generalized abdominal tenderness.   Musculoskeletal:         General: No deformity. Normal range of motion.   Neurological:      General: No focal deficit present.      Mental Status: He is alert and oriented to person, place, and time.      Sensory: No sensory deficit.      Motor: No weakness.      Coordination: Coordination normal.      Gait: Gait normal.         ECG 12 Lead      Date/Time:  12/7/2024 2:10 AM    Performed by: Claudio Benítez MD  Authorized by: Claudio Benítez MD  Interpreted by ED physician  Comparison: compared with previous ECG   Similar to previous ECG  Rhythm: sinus rhythm  Rate: tachycardic  BPM: 108  QRS axis: normal  Conduction: conduction normal  Conduction: LAFB  Clinical impression: non-specific ECG  Comments: Interpretation:  EKG was directly visualized by myself, interpretations as documented in hospital course.               ED Course  ED Course as of 12/07/24 0422   Sat Dec 07, 2024   0212 BP(!): 184/116 [JK]   0212 Temp: 97.9 °F (36.6 °C) [JK]   0212 Heart Rate(!): 124 [JK]   0212 Resp(!): 32 [JK]   0212 SpO2: 100 %  Interpretation:  Patient's repeat vitals, telemetry tracing, and pulse oximetry tracing were directly viewed and interpreted by myself.   O2 sat 100% on room air, interpreted as normal  Telemetry rhythm strip revealed a rate of 124 bpm, interpreted as sinus tachycardia [JK]   0213 CBC & Differential(!) [JK]   0213 Single High Sensitivity Troponin T [JK]   0213 BNP [JK]   0213 Lipase [JK]   0213 Beta Hydroxybutyrate Quantitative(!) [JK]   0213 Lactic Acid, Plasma(!!) [JK]   0213 Procalcitonin [JK]   0213 Comprehensive Metabolic Panel(!) [JK]   0213 Blood Gas, Venous With Co-Ox(!)  Interpretation:  Patient's Blood Gas was directly viewed and interpreted by myself.   Unremarkable [JK]   0213 Interpretation:  Laboratory studies were reviewed and interpreted directly by myself.  CMP showed some minor hyperglycemia with glucose of 268, procalcitonin normal, lactic acid slightly elevated 2.2, beta hydroxybutyrate was marginally elevated at 0.44 lipase normal, BNP normal, troponin normal, CBC showed some leukocytosis with a white blood cell count of 27.44 [JK]   0213 CT Abdomen Pelvis With Contrast [JK]   0213 XR Chest 1 View  Interpretation:  Imaging was directly visualized by myself, per my interpretations, chest x-ray was unremarkable, CT of the abdomen  pelvis did not show any acute process. [JK]   0419 Patient's repeat lactic and white blood cell count after fluid ministration has significantly improved. [JK]   0419 I do believe the majority of his laboratory abnormalities are likely related to hemoconcentration and his hyperglycemia. [JK]   0419 Patient is feeling much better after fluid administration and medication administration.  We did have a long discussion regarding his blood glucose and the appropriate need for close monitoring and control.  We also spoke with diet modifications. [JK]   0420 Repeat neurologic exam does not show any deficits at this time.  There is no evidence of acute abdomen or peritonitis.  Patient's hemodynamics have significantly improved.  Patient will follow-up with PCP in 48 hours.  Given strict return precautions.  Verbalized understanding. [JK]   0420 I had a discussion with the patient/family regarding diagnosis, diagnostic results, treatment plan, and medications. The patient/family indicated understanding of these instructions. I spent adequate time at the bedside prior to discharge necessary to discuss the aftercare instructions, giving patient education, providing explanations of the results of our evaluations/findings, and my decision making to assure that the patient/family understand the plan of care. Time was allotted to answer questions at that time and throughout the ED course. Patient is required to maintain timely follow up, as discussed. I also discussed the potential for the development of an acute emergent condition requiring further evaluation, return to the ER, admission, or even surgical intervention.  I encouraged the patient to return to the emergency department immediately for any concerns, worsening symptoms, new complaints, or if symptoms persist and they are unable to seek follow-up in a timely fashion. The patient/family expressed understanding and agreement with this plan    Shared decision making:    After full review of the patient's clinical presentation, review of any work-up including but not limited to laboratory studies and radiology obtained, I had a discussion with the patient.  Treatment options were discussed as well as the risks, benefits and consequences.  I discussed all findings with the patient and family members if available.  During the discussion, treatment goals were understood by all as well as any misconceptions which were addressed with the patient.  Ample time was given for any questions they may have had.  They are in agreement with the treatment plan as well as final disposition. [JK]      ED Course User Index  [JK] Claudio Benítez MD                                                       Medical Decision Making  This is a 49-year-old male with history of hypertension and diabetes presenting to the emergency department with numerous complaints.  The patient is extremely anxious on initial presentation.  Do believe this is driving his underlying symptoms.  As far as his neurologic exam, the patient is ambulatory.  He does not have any focal neurologic deficit.  His NIH stroke scale is 0 at this time.  Symptoms seem atypical for CVA.  He does have some generalized abdominal discomfort which I am concerned for possible gastritis or UTI.  Overall, the patient is nontoxic.  Afebrile.  IV access was established and the patient.  Placed on continuous telemetry monitoring.  Given the patient's presentation, differential is broad and will require further evaluation.  Workup initiated.      Differential diagnosis: Peptic ulcer disease, dyspepsia, GERD, pancreatitis, biliary colic, electrolyte abnormality, acute kidney injury, anxiety, UTI      Amount and/or Complexity of Data Reviewed  External Data Reviewed: labs, radiology, ECG and notes.     Details: External laboratories, imaging as well as notes were reviewed personally by myself.  All relevant studies were used to guide decision  making.     Date of previous record: 10/29/2024    Source of note: Primary physician    Summary:  Patient was seen and evaluated for routine visit.  I did review basic laboratory studies on file as well as a previous chest x-ray and EKG.  Records reviewed    Labs: ordered. Decision-making details documented in ED Course.  Radiology: ordered and independent interpretation performed. Decision-making details documented in ED Course.  ECG/medicine tests: ordered and independent interpretation performed. Decision-making details documented in ED Course.    Risk  Prescription drug management.        Final diagnoses:   Acute superficial gastritis without hemorrhage   Epigastric pain   Hyperglycemia   Dehydration   Primary hypertension   Muscle spasm       ED Disposition  ED Disposition       ED Disposition   Discharge    Condition   Stable    Comment   --               Deidra Hurtado DO  [address] [id number] [phone]    Call in 1 day           Medication List        New Prescriptions      promethazine 25 MG tablet  Commonly known as: PHENERGAN  Take 1 tablet by mouth Every 8 (Eight) Hours As Needed for Nausea or Vomiting for up to 3 days.               Where to Get Your Medications        These medications were sent to [id number] - [place], [place] - [address] [place] [address]  AT [facility] - [phone]  - [phone] FX  [address] [facility] [address]      Phone: [phone]   promethazine 25 MG tablet            Claudio Benítez MD  12/07/24 5796

## 2024-12-08 LAB
QT INTERVAL: 372 MS
QTC INTERVAL: 498 MS

## 2024-12-09 ENCOUNTER — TELEPHONE (OUTPATIENT)
Dept: INTERNAL MEDICINE | Facility: CLINIC | Age: 49
End: 2024-12-09

## 2024-12-09 NOTE — TELEPHONE ENCOUNTER
Called and left message for the need to reschedule appointment due to provider being out of office  Left office number for patient to return call and reschedule   Appointment is TCM from 12/06/24 ED visit  HUB CAN RESCHEDULE

## 2024-12-09 NOTE — TELEPHONE ENCOUNTER
PATIENT SHOWED UP FOR APPT TODAY, I EXPLAINED THAT WE TRIED TO REACH OUT TO HIM BECAUSE DR. CHAMPION HAD A FAMILY EMERGENCY.    I DID VERIFY THE PHONE NUMBER WITH THE PATIENT AND HE CONFIRMED THAT WAS CORRECT, BUT HE DOES NOT KEEP HIS VOLUME ON.    WHEN I TRIED TO RESCHEDULE THE PATIENT, HE STATED THAT HE CAN ONLY DO TUES & THURSDAY. I OFFERED THE NEXT OPEN TIME FRAME WITH THEIR REQUEST (I DID OFFER SOONER BUT IT WAS NOT ON A TUES OR THURS).   PATIENT WAS FRUSTRATED AND LEFT WITHOUT RESCHEDULING.

## 2025-03-05 DIAGNOSIS — E11.65 TYPE 2 DIABETES MELLITUS WITH HYPERGLYCEMIA, WITHOUT LONG-TERM CURRENT USE OF INSULIN: ICD-10-CM
